# Patient Record
Sex: MALE | Race: OTHER | HISPANIC OR LATINO | Employment: FULL TIME | URBAN - METROPOLITAN AREA
[De-identification: names, ages, dates, MRNs, and addresses within clinical notes are randomized per-mention and may not be internally consistent; named-entity substitution may affect disease eponyms.]

---

## 2018-01-30 ENCOUNTER — HOSPITAL ENCOUNTER (EMERGENCY)
Facility: HOSPITAL | Age: 23
Discharge: HOME | End: 2018-01-30
Admitting: EMERGENCY MEDICINE
Payer: COMMERCIAL

## 2018-01-30 VITALS
WEIGHT: 205 LBS | RESPIRATION RATE: 18 BRPM | HEART RATE: 78 BPM | DIASTOLIC BLOOD PRESSURE: 68 MMHG | TEMPERATURE: 97.6 F | OXYGEN SATURATION: 100 % | SYSTOLIC BLOOD PRESSURE: 134 MMHG
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
## 2018-01-30 DIAGNOSIS — M54.2 NECK PAIN ON LEFT SIDE: ICD-10-CM

## 2018-01-30 DIAGNOSIS — M54.9 ACUTE LEFT-SIDED BACK PAIN: ICD-10-CM

## 2018-01-30 PROCEDURE — 99283 EMERGENCY DEPT VISIT LOW MDM: CPT
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
## 2018-01-30 NOTE — DISCHARGE INSTRUCTIONS
Dolor de jayce dominique   CUIDADO AMBULATORIO:   El dolor de jayce dominique  comienza repentinamente, aumenta rápidamente y desaparece en unos días  El dolor podría ir y venir, o podría empeorar con ciertos movimientos  El dolor podría sentirse solamente en cutler jayce, o podría pasarse a marija brazos, espalda u hombros  También podría sentir dolor que comienza en otra área de cutler cuerpo y se pasa a cutler jayce  Busque atención médica de inmediato si:   · Usted tiene bd lesión que le provoca dolor en el jayce y dolor punzante debajo de marija brazos o piernas  · Cutler dolor de jayce se agrava repentinamente  · Usted tiene dolor de jayce junto con entumecimiento, hormigueo o debilidad en marija brazos o piernas  · Usted tiene rigidez en el jayce, dolor de [place]  Pregúntele a cutler Maxcine Rosa vitaminas y minerales son adecuados para usted  · Usted tiene nuevos síntomas o marija síntomas empeoran  · Marija síntomas continúan aún después del [place]  · Usted tiene preguntas o inquietudes acerca de cutler condición o cuidado  El tratamiento  podría incluir cualquiera de lo siguiente, dependiendo de lo que esté provocando cutler dolor:  · Medicamentos,  podrían ser recetados o recomendados para el dolor por cutler médico  Es posible que usted necesite medicamento para tratar el dolor de nervios o para detener los espasmos musculares  También podrían darle medicamentos para reducir la inflamación  Cutler médico podría inyectar medicamento a un nervio para bloquear el dolor  El medicamento de venta sin receta AINEs o acetaminofén podría ser recomendado para tratar el dolor o inflamación leve  · La tracción  se [place] para aliviar la presión de los nervios  Le estirarán la garett cuidadosamente alejándola de cutler jayce  [place] estira los músculos y los ligamentos y le da más espacio a cutler columna  Cutler médico le indicará qué tipo de tracción ayudará a cutler dolor de jayce   No  use dispositivos de tracción en cutler casa a menos que se lo indique cutler médico   Controle o evite el dolor de jayce dominique:   · Repose el jayce hollis se lo indiquen  No realice movimientos repentinos, hollis voltear cutler garett rápidamente  Cutler médico podría recomendarle que use un collarín cervical por un periodo corto de [place]  El collarín evitará que usted [place] cutler [place]  [place] ayudará a darle tiempo a cutler jayce para que sane si es que db lesión está provocando cutler dolor  Pregunte a cutler médico cuándo puede volver a practicar deportes o realizar otras actividades cotidianas  · Aplique calor según indicaciones  El calor ayuda a aliviar el dolor y la inflamación  Use db envoltura caliente o empape db toalla pequeña en agua tibia  Escurra el exceso de Ad Listen  Aplique la venda caliente o la toalla por 20 minutos cada hora o hollis se le indique  · Aplique hielo según las indicaciones  El hielo ayuda a aliviar el dolor y la inflamación, y a evitar daño al tejido  Use un paquete con hielo o ponga hielo en db bolsa  Cubra el paquete con hielo o la espalda con db toalla antes de aplicarlo en cutler jayce  Aplique el paquete de hielo o la bolsa por 15 minutos cada hora o hollis se lo indiquen  Cutler médico puede indicarle con qué frecuencia aplicar el hielo  · Magy ejercicios para el jayce hollis se lo indiquen  Los ejercicios para el jayce ayudan a [employer] y a aumentar el rango de [place]  Cutler médico le indicará cuáles ejercicios son adecuados para usted  Podría darle instrucciones o podría recomendarle que acuda con un fisioterapeuta  Cutler médico o terapeuta pueden asegurarse que usted esté haciendo estos ejercicios correctamente  · Mantenga db buena postura  Trate de mantener cutler garett y hombros levantados cuando se siente  Si usted trabaja en frente de db computadora, asegúrese de que el monitor esté al nivel adecuado  Usted no debería tener que mirar hacia abajo para reinaldo la pantalla   [place]oco debe tener que inclinarse hacia adelante para poder leer lo que está en la pantalla  Asegúrese de que cutler teclado, ratón y otros artículos de computadora estén colocados en donde usted no tenga que extender kofi hombros para alcanzarlos  [place] a menudo si usted trabaja frente a db computadora o permanece sentado mona largos períodos  Estírese o camine para [place] [employer] de cutler jayce relajados  Acuda a kofi consultas de control con cutler médico según le indicaron  Cutler médico podría referirlo a un especialista si cutler dolor no mejora con el tratamiento  Anote kofi preguntas para que se acuerde de hacerlas mona kofi visitas  © 2017 [address] Information is for End User's use only and may not be sold, redistributed or otherwise used for commercial purposes  All illustrations and images included in CareNotes® are the copyrighted property of A D A M , Inc  or Seng Ortiz  Esta información es sólo para uso en educación  Cutler intención no es darle un consejo médico sobre enfermedades o tratamientos  Colsulte con cutler Terence Lauth farmacéutico antes de seguir cualquier régimen médico para saber si es seguro y efectivo para usted

## 2018-07-31 ENCOUNTER — OFFICE VISIT (OUTPATIENT)
Dept: FAMILY MEDICINE CLINIC | Facility: CLINIC | Age: 23
End: 2018-07-31

## 2018-07-31 VITALS
SYSTOLIC BLOOD PRESSURE: 110 MMHG | WEIGHT: 197.2 LBS | TEMPERATURE: 97.5 F | HEIGHT: 70 IN | BODY MASS INDEX: 28.23 KG/M2 | DIASTOLIC BLOOD PRESSURE: 66 MMHG

## 2018-07-31 DIAGNOSIS — J06.9 ACUTE URI: ICD-10-CM

## 2018-07-31 DIAGNOSIS — R05.9 COUGH: ICD-10-CM

## 2018-07-31 DIAGNOSIS — N50.89 MASS OF LEFT TESTICLE: Primary | ICD-10-CM

## 2018-07-31 PROCEDURE — 99212 OFFICE O/P EST SF 10 MIN: CPT | Performed by: NURSE PRACTITIONER

## 2018-07-31 RX ORDER — BENZONATATE 100 MG/1
100 CAPSULE ORAL 3 TIMES DAILY PRN
Qty: 20 CAPSULE | Refills: 0 | Status: SHIPPED | OUTPATIENT
Start: 2018-07-31 | End: 2021-01-20 | Stop reason: HOSPADM

## 2018-07-31 RX ORDER — AZITHROMYCIN 250 MG/1
TABLET, FILM COATED ORAL
Qty: 6 TABLET | Refills: 0 | Status: SHIPPED | OUTPATIENT
Start: 2018-07-31 | End: 2018-08-04

## 2018-07-31 RX ORDER — PREDNISONE 10 MG/1
TABLET ORAL
Qty: 21 TABLET | Refills: 0 | Status: SHIPPED | OUTPATIENT
Start: 2018-07-31 | End: 2021-01-20 | Stop reason: HOSPADM

## 2018-07-31 NOTE — PROGRESS NOTES
Assessment/Plan:    Acute URI  Will start azithromycin and prednisone and tessalon perles for cough  Call us if you experience any worsening symptoms or no improvement  Mass of left testicle  Firm immobile round lump on left lateral testicle  Non tender to palpation  No drainage  Present for 2 years and has grown in size  Will obtain ultrasound to identify etiology  Possible cyst  Patient currently does not have insurance and testing and health maintenance is currently limited  Patient does perform monthly testicular exams  As stated above care limited by patient's lack of insurance  He will look into medical assistance  Would like STD testing, [facility]  Will follow up with ultrasound results  Would be due for baseline lab work too but wishes to wait until insurance is figured out  Diagnoses and all orders for this visit:    Mass of left testicle  -     US scrotum and testicles; Future    Acute URI  -     predniSONE 10 mg tablet; Day 1: 6 tabs  Day 2: 5 tabs Day 3: 4 tabs  Day 4: 3 tabs  Day 5: 2 tabs  Day 6: 1 tab  -     azithromycin (ZITHROMAX) 250 mg tablet; Take 2 tablets today then 1 tablet daily x 4 days    Cough  -     benzonatate (TESSALON PERLES) 100 mg capsule; Take 1 capsule (100 mg total) by mouth 3 (three) times a day as needed for cough    Other orders  -     Cancel: HIV 1/2 Antigen/Antibody,Fourth Generation W/Rfl; Future  -     Cancel: RPR; Future  -     Cancel: Chlamydia/GC amplified DNA by PCR; Future  -     Cancel: HSV TYPE 1,2 DNA PCR; Future      Patient verbalizes understand and agrees with treatment plan  Subjective:      Patient ID: Shruthi West is a 23 y o  male    Chief Complaint   Patient presents with    [facility]    Migraine     started yesterday    Cough     symptoms started 2 days ago; taking dayquil and nyquil    Sore Throat    Chills    Back Pain     no urinary problems indicated    Mass     on scrotum; had for 2 years, no symptoms indicated          Patient presents to office today for evaluation of cold symptoms  He started 2 days ago and progressively worsened  Patient states that he initially did have lower back pain that resolved  He denies any frequency or urgency hematuria or dysuria  He has been trying DayQuil NyQuil which has not helped  A classmate if his was recently sick  He is currently a student at [employer]  He also has complaints of left-sided testicular lump before 2 years  Patient states over the past 2 years it has grown in size and gotten harder  He states that at 1st it seemed like it popped a little bit but has not done that in a long time  Denies any testicular swelling or pain  He states that sometimes his testicle feels uncomfortable at times but no pain on that specific spot  Patient overall is due for routine screening and health maintenance exams which have been put off due to lack of insurance at this time  Patient is looking into getting insurance  Cough   This is a new problem  The current episode started in the past 7 days  The problem has been gradually worsening  The cough is non-productive  Associated symptoms include ear congestion, nasal congestion, postnasal drip, rhinorrhea, a sore throat and wheezing  Pertinent negatives include no hemoptysis  There is no history of asthma  The following portions of the patient's history were reviewed and updated as appropriate: allergies, current medications, past family history, past social history and problem list   Review of Systems   HENT: Positive for postnasal drip, rhinorrhea and sore throat  Respiratory: Positive for wheezing  Negative for hemoptysis          Objective:  /66 (BP Location: Left arm, Patient Position: Sitting, Cuff Size: Standard)   Temp 97 5 °F (36 4 °C)   Ht 5' 10 25" (1 784 m)   Wt 89 4 kg (197 lb 3 2 oz)   BMI 28 09 kg/m²      Physical Exam   Constitutional: He is oriented to person, place, and time  He appears well-developed  No distress  HENT:   Head: Normocephalic and atraumatic  Right Ear: Hearing, external ear and ear canal normal  No drainage, swelling or tenderness  No foreign bodies  Tympanic membrane is not perforated, not erythematous, not retracted and not bulging  A middle ear effusion is present  No decreased hearing is noted  Left Ear: External ear and ear canal normal  No drainage, swelling or tenderness  No foreign bodies  Tympanic membrane is not perforated, not erythematous, not retracted and not bulging  A middle ear effusion is present  No decreased hearing is noted  Nose: Rhinorrhea present  Right sinus exhibits no maxillary sinus tenderness and no frontal sinus tenderness  Left sinus exhibits no maxillary sinus tenderness and no frontal sinus tenderness  Mouth/Throat: Uvula is midline  Posterior oropharyngeal erythema present  No oropharyngeal exudate or posterior oropharyngeal edema  Eyes: Conjunctivae and lids are normal  Right eye exhibits no discharge  Left eye exhibits no discharge  Neck: Normal range of motion  Neck supple  No tracheal deviation present  Cardiovascular: Normal rate and regular rhythm  No murmur heard  Pulmonary/Chest: Effort normal and breath sounds normal  No respiratory distress  He has no wheezes  Abdominal: Soft  Bowel sounds are normal  He exhibits no distension  There is no tenderness  There is no guarding  Genitourinary: Right testis shows no mass, no swelling and no tenderness  Right testis is descended  Left testis shows mass  Left testis shows no swelling and no tenderness  Left testis is descended  Genitourinary Comments: Left lateral testicular mass, firm and immobile and non tender  No edema erythema or warmth or drainage present  Musculoskeletal: Normal range of motion  He exhibits no edema, tenderness or deformity     Lymphadenopathy: He has no cervical adenopathy  Neurological: He is alert and oriented to person, place, and time  Coordination normal    Skin: Skin is warm and dry  No rash noted  He is not diaphoretic  No erythema  Psychiatric: He has a normal mood and affect  His speech is normal and behavior is normal  Judgment and thought content normal  Cognition and memory are normal    Nursing note and vitals reviewed

## 2018-07-31 NOTE — PATIENT INSTRUCTIONS
Start prednisone, this is the steroid  It will be 6 pills today and decrease by 1 pill each day for the following 6 days  So it will be 6-5-4-3-2-1  Take this with food as it may upset your stomach  It's best to take it earlier in the day otherwise it may keep you up at night  Start azithromycin, this is the antibiotic, This is 2 pills on day one and then 1 pill for the following 4 days  You may use tessalon Perles every 8 hours as needed for cough  Call us if you experience any worsening symptoms or no improvement  Call and schedule ultrasound of scrotum

## 2018-08-01 PROBLEM — N50.89 MASS OF LEFT TESTICLE: Status: ACTIVE | Noted: 2018-08-01

## 2018-08-01 NOTE — ASSESSMENT & PLAN NOTE
Firm immobile round lump on left lateral testicle  Non tender to palpation  No drainage  Present for 2 years and has grown in size  Will obtain ultrasound to identify etiology  Possible cyst  Patient currently does not have insurance and testing and health maintenance is currently limited  Patient does perform monthly testicular exams

## 2018-08-09 ENCOUNTER — HOSPITAL ENCOUNTER (OUTPATIENT)
Dept: ULTRASOUND IMAGING | Facility: HOSPITAL | Age: 23
Discharge: HOME | End: 2018-08-09

## 2018-08-09 DIAGNOSIS — N50.89 MASS OF LEFT TESTICLE: ICD-10-CM

## 2018-08-09 PROCEDURE — 76870 US EXAM SCROTUM: CPT

## 2019-11-19 ENCOUNTER — TELEPHONE (OUTPATIENT)
Dept: FAMILY MEDICINE CLINIC | Facility: CLINIC | Age: 24
End: 2019-11-19

## 2019-11-20 ENCOUNTER — TELEPHONE (OUTPATIENT)
Dept: FAMILY MEDICINE CLINIC | Facility: CLINIC | Age: 24
End: 2019-11-20

## 2019-11-20 NOTE — TELEPHONE ENCOUNTER
Wrong number was in the pt's chart  Lmom requesting Tory( at the correct number)  please call the office back to schedule na appointment

## 2019-11-20 NOTE — TELEPHONE ENCOUNTER
Attempted to call [facility] regarding scheduling an appointment  There was an answer and then someone said  the phone and call was disconnected

## 2019-11-22 NOTE — TELEPHONE ENCOUNTER
Per Mirna Quinn informed us that he currently does not have insurance once he does he will call to schedule an apppointment

## 2019-11-27 ENCOUNTER — OFFICE VISIT (OUTPATIENT)
Dept: FAMILY MEDICINE CLINIC | Facility: CLINIC | Age: 24
End: 2019-11-27

## 2019-11-27 VITALS
HEART RATE: 78 BPM | TEMPERATURE: 97.2 F | OXYGEN SATURATION: 96 % | SYSTOLIC BLOOD PRESSURE: 108 MMHG | RESPIRATION RATE: 19 BRPM | DIASTOLIC BLOOD PRESSURE: 64 MMHG | BODY MASS INDEX: 26.08 KG/M2 | HEIGHT: 73 IN | WEIGHT: 196.8 LBS

## 2019-11-27 DIAGNOSIS — N50.89 SCROTAL MASS: Primary | ICD-10-CM

## 2019-11-27 DIAGNOSIS — R05.9 COUGH: ICD-10-CM

## 2019-11-27 DIAGNOSIS — R10.32 LEFT LOWER QUADRANT ABDOMINAL PAIN: ICD-10-CM

## 2019-11-27 DIAGNOSIS — J06.9 UPPER RESPIRATORY TRACT INFECTION, UNSPECIFIED TYPE: ICD-10-CM

## 2019-11-27 PROCEDURE — 99213 OFFICE O/P EST LOW 20 MIN: CPT | Performed by: FAMILY MEDICINE

## 2019-11-27 RX ORDER — AZITHROMYCIN 250 MG/1
TABLET, FILM COATED ORAL
Qty: 6 TABLET | Refills: 0 | Status: SHIPPED | OUTPATIENT
Start: 2019-11-27 | End: 2019-12-02

## 2019-11-27 NOTE — PATIENT INSTRUCTIONS
Here for left scrotal skin cyst that is getting larger, will give info for urologist as this is larger  Take aleve otc prn left low back pain and will rec calling if not better or worse as his LLQ abdominal pain may be a strain in the muscle  Take abx as directed for URI if not betterand use Dimetapp DM cold and cough

## 2019-11-27 NOTE — PROGRESS NOTES
Assessment/Plan:  Chief Complaint   Patient presents with    Cold Like Symptoms     Pt c/o nasal and chest congestion with cough x1 week   Mass     Pt has a mass on his L/groin area x1 year and it has been increasing in size  Patient Instructions   Here for left scrotal skin cyst that is getting larger, will give info for urologist as this is larger  Take aleve otc prn left low back pain and will rec calling if not better or worse as his LLQ abdominal pain may be a strain in the muscle  Take abx as directed for URI if not betterand use Dimetapp DM cold and cough  No problem-specific Assessment & Plan notes found for this encounter  Diagnoses and all orders for this visit:    Scrotal mass    Upper respiratory tract infection, unspecified type  -     azithromycin (ZITHROMAX) 250 mg tablet; Take 2 tablets today then 1 tablet daily x 4 days    Cough  -     azithromycin (ZITHROMAX) 250 mg tablet; Take 2 tablets today then 1 tablet daily x 4 days    Left lower quadrant abdominal pain          Subjective:      Patient ID: Anamika Carrillo is a 30 y o  male  Cold Like Symptoms (Pt c/o nasal and chest congestion with cough x1 week  )  Mass (Pt has a mass on his L/groin area x1 year and it has been increasing in size  ) Has left low back pain and also left scrotal skin cyst seen on US previously  This is getting larger  Has also noticed LLQ abdominal pain and worse with stretching left low back  The following portions of the patient's history were reviewed and updated as appropriate: allergies, current medications, past family history, past medical history, past social history, past surgical history and problem list     Review of Systems   Constitutional: Negative  HENT: Positive for congestion  Eyes: Negative  Respiratory: Positive for cough  Cardiovascular: Negative      Gastrointestinal: Positive for abdominal pain (left lower quadrant pain worse with stretching left lower back)    Endocrine: Negative  Genitourinary:        Scrotal mass   Musculoskeletal: Positive for back pain (left low back pain)  Skin: Negative  Allergic/Immunologic: Negative  Neurological: Negative  Hematological: Negative  Psychiatric/Behavioral: Negative  Objective:      /64 (BP Location: Left arm, Patient Position: Sitting, Cuff Size: Adult)   Pulse 78   Temp (!) 97 2 °F (36 2 °C) (Tympanic)   Resp 19   Ht 6' 1" (1 854 m)   Wt 89 3 kg (196 lb 12 8 oz)   SpO2 96%   BMI 25 96 kg/m²          Physical Exam   Constitutional: He is oriented to person, place, and time  He appears well-developed and well-nourished  HENT:   Head: Normocephalic and atraumatic  Right Ear: External ear normal    Left Ear: External ear normal    Nose: Nose normal    Mouth/Throat: Oropharynx is clear and moist    Eyes: Pupils are equal, round, and reactive to light  Conjunctivae and EOM are normal    Neck: Normal range of motion  Neck supple  Cardiovascular: Normal rate, regular rhythm, normal heart sounds and intact distal pulses  Pulmonary/Chest: Effort normal and breath sounds normal    Abdominal: Soft  Bowel sounds are normal    Tender left lower quadrant worse with stretching of left low back  Genitourinary:   Genitourinary Comments: Pain and tenderness over left scrotal area mass, left scrotal skin cyst seen on US  Musculoskeletal: Normal range of motion  Left low back pain    Neurological: He is alert and oriented to person, place, and time  He has normal reflexes  Skin: Skin is warm and dry  Psychiatric: He has a normal mood and affect   His behavior is normal

## 2020-02-25 ENCOUNTER — TELEPHONE (OUTPATIENT)
Dept: FAMILY MEDICINE CLINIC | Facility: CLINIC | Age: 25
End: 2020-02-25

## 2020-02-25 NOTE — TELEPHONE ENCOUNTER
A note is fine if needed  slowly introducing fluids so only a few sips at a time, if well tolerated can repeat in about 15-30 minutes then so on,  Can increase amount and frequency if well tolerated  Then can progress to bland food/thick liquids like jello or toast    Hydration is important- gatorade or a drink with electrolytes are helpful       Acute nausea and vomiting can last 1-2 days- if no improvement or any worsening symptoms call or go to ER

## 2020-02-25 NOTE — TELEPHONE ENCOUNTER
Patients step mother states that patient has been vomiting all night  He can not keep anything down  What is the protocol for keeping liquids down? He missed class yesterday and today  Can he also have a school note

## 2020-03-31 ENCOUNTER — TELEMEDICINE (OUTPATIENT)
Dept: FAMILY MEDICINE CLINIC | Facility: CLINIC | Age: 25
End: 2020-03-31
Payer: OTHER GOVERNMENT

## 2020-03-31 DIAGNOSIS — Z20.828 EXPOSURE TO SARS-ASSOCIATED CORONAVIRUS: ICD-10-CM

## 2020-03-31 DIAGNOSIS — Z20.828 EXPOSURE TO SARS-ASSOCIATED CORONAVIRUS: Primary | ICD-10-CM

## 2020-03-31 PROCEDURE — 87635 SARS-COV-2 COVID-19 AMP PRB: CPT

## 2020-03-31 PROCEDURE — G2012 BRIEF CHECK IN BY MD/QHP: HCPCS | Performed by: NURSE PRACTITIONER

## 2020-03-31 NOTE — PROGRESS NOTES
COVID-19 Virtual Visit     This virtual check-in was done via [employer] and patient was informed that this is not a secure, HIPAA-complaint platform  he agrees to proceed     Encounter provider Emeli Stover, [facility]    Provider located at [address] [address]    Recent Visits  No visits were found meeting these conditions  Showing recent visits within past 7 days and meeting all other requirements     Today's Visits  Date Type Provider Dept   03/31/20 Telemedicine Emeli Stover, [facility] [address] [facility] today's visits and meeting all other requirements     Future Appointments  Date Type Provider Dept   03/31/20 Telemedicine Emeli Stover, [facility] [address] [facility]   Showing future appointments within next 150 days and meeting all other requirements        Patient agrees to participate in a virtual check in via telephone or video visit instead of presenting to the office to address urgent/immediate medical needs  Patient is aware this is a billable service  After connecting through televideo, the patient was identified by name and date of birth  Ida Lee was informed that this was a telemedicine visit and that the exam was being conducted confidentially over secure lines  My office door was closed  No one else was in the room  Ida Lee acknowledged consent and understanding of privacy and security of the telemedicine visit  I informed the patient that I have reviewed his record in Epic and presented the opportunity for him to ask any questions regarding the visit today  The patient agreed to participate  Ida Lee is a 30 y o  male who is concerned about COVID-19  He reports fever, cough and shortness of breath  He has not traveled outside the [place]  within the last 14 days    He has not had contact with a person who is under investigation for or who is positive for COVID-19 within the last 14 days  He has not been hospitalized recently for fever and/or lower respiratory symptoms  Has virtual visit today for evaluation of fever  His temp was checked at work prior to entering building  Temp was 101 5 and then went to 100 5, and then 45 minutes later it was back at 101  They were using a temporal thermometer  He doesn't feel shaky  Has chronic cough due to smoking, unsure if it's changed  Has had some very slight shortness of breath but has had that for a few months  At home checked his temperature and it was 97  No GI symptoms, no rashes or headaches  Was in [place] working the past 2 weeks until 2 days ago  Was in [address] [place] [address], no known exposure to [employer] while on the phone was 97 7  Recent weight was 195  Hasn't taken tylenol or advil this morning  Did take ibuprofen yesterday for hangover  He states no one else they checked had a temperature this morning  No past medical history on file  Past Surgical History:   Procedure Laterality Date    FRACTURE SURGERY         Current Outpatient Medications   Medication Sig Dispense Refill    benzonatate (TESSALON PERLES) 100 mg capsule Take 1 capsule (100 mg total) by mouth 3 (three) times a day as needed for cough (Patient not taking: Reported on 11/27/2019) 20 capsule 0    predniSONE 10 mg tablet Day 1: 6 tabs  Day 2: 5 tabs Day 3: 4 tabs  Day 4: 3 tabs  Day 5: 2 tabs  Day 6: 1 tab (Patient not taking: Reported on 11/27/2019) 21 tablet 0     No current facility-administered medications for this visit  No Known Allergies    Video Exam    Harjeet appears healthy, alert, no distress  Disposition:      I referred Cassius William to one of our centralized sites for a COVID-19 swab      I spent 15 minutes with the patient during this virtual check-in visit

## 2020-04-03 ENCOUNTER — TELEPHONE (OUTPATIENT)
Dept: FAMILY MEDICINE CLINIC | Facility: CLINIC | Age: 25
End: 2020-04-03

## 2020-04-03 LAB — SARS-COV-2 RNA SPEC QL NAA+PROBE: NOT DETECTED

## 2021-01-19 ENCOUNTER — ANESTHESIA (EMERGENCY)
Dept: PERIOP | Facility: HOSPITAL | Age: 26
End: 2021-01-19
Payer: COMMERCIAL

## 2021-01-19 ENCOUNTER — APPOINTMENT (EMERGENCY)
Dept: CT IMAGING | Facility: HOSPITAL | Age: 26
End: 2021-01-19
Payer: COMMERCIAL

## 2021-01-19 ENCOUNTER — ANESTHESIA EVENT (EMERGENCY)
Dept: PERIOP | Facility: HOSPITAL | Age: 26
End: 2021-01-19
Payer: COMMERCIAL

## 2021-01-19 ENCOUNTER — HOSPITAL ENCOUNTER (OUTPATIENT)
Facility: HOSPITAL | Age: 26
Setting detail: OBSERVATION
Discharge: HOME | End: 2021-01-20
Attending: EMERGENCY MEDICINE | Admitting: SURGERY
Payer: COMMERCIAL

## 2021-01-19 VITALS — HEART RATE: 113 BPM

## 2021-01-19 DIAGNOSIS — K35.30 ACUTE APPENDICITIS WITH LOCALIZED PERITONITIS, WITHOUT PERFORATION, ABSCESS, OR GANGRENE: Primary | ICD-10-CM

## 2021-01-19 DIAGNOSIS — R10.9 ABDOMINAL PAIN: ICD-10-CM

## 2021-01-19 DIAGNOSIS — E86.0 DEHYDRATION: ICD-10-CM

## 2021-01-19 DIAGNOSIS — R11.2 NAUSEA AND VOMITING: ICD-10-CM

## 2021-01-19 DIAGNOSIS — K35.80 ACUTE APPENDICITIS, UNSPECIFIED ACUTE APPENDICITIS TYPE: ICD-10-CM

## 2021-01-19 DIAGNOSIS — K37 APPENDICITIS: ICD-10-CM

## 2021-01-19 LAB
ALBUMIN SERPL BCP-MCNC: 4.9 G/DL (ref 3–5.2)
ALP SERPL-CCNC: 75 U/L (ref 43–122)
ALT SERPL W P-5'-P-CCNC: 10 U/L (ref 9–52)
ANION GAP SERPL CALCULATED.3IONS-SCNC: 10 MMOL/L (ref 5–14)
AST SERPL W P-5'-P-CCNC: 21 U/L (ref 17–59)
BASOPHILS # BLD AUTO: 0.19 THOUSAND/UL (ref 0–0.1)
BASOPHILS NFR MAR MANUAL: 1 % (ref 0–1)
BILIRUB SERPL-MCNC: 0.9 MG/DL
BUN SERPL-MCNC: 16 MG/DL (ref 5–25)
CALCIUM SERPL-MCNC: 10.1 MG/DL (ref 8.4–10.2)
CHLORIDE SERPL-SCNC: 100 MMOL/L (ref 97–108)
CO2 SERPL-SCNC: 31 MMOL/L (ref 22–30)
CREAT SERPL-MCNC: 0.88 MG/DL (ref 0.7–1.5)
EOSINOPHIL # BLD AUTO: 0.19 THOUSAND/UL (ref 0–0.4)
EOSINOPHIL NFR BLD MANUAL: 1 % (ref 0–6)
ERYTHROCYTE [DISTWIDTH] IN BLOOD BY AUTOMATED COUNT: 13.2 %
GFR SERPL CREATININE-BSD FRML MDRD: 118 ML/MIN/1.73SQ M
GLUCOSE SERPL-MCNC: 103 MG/DL (ref 70–99)
HCT VFR BLD AUTO: 47.1 % (ref 41–53)
HGB BLD-MCNC: 15.3 G/DL (ref 13.5–17.5)
LYMPHOCYTES # BLD AUTO: 1.75 THOUSAND/UL (ref 0.5–4)
LYMPHOCYTES # BLD AUTO: 9 % (ref 25–45)
MCH RBC QN AUTO: 31.1 PG (ref 26–34)
MCHC RBC AUTO-ENTMCNC: 32.5 G/DL (ref 31–36)
MCV RBC AUTO: 96 FL (ref 80–100)
MONOCYTES # BLD AUTO: 1.75 THOUSAND/UL (ref 0.2–0.9)
MONOCYTES NFR BLD AUTO: 9 % (ref 1–10)
NEUTS BAND NFR BLD MANUAL: 1 % (ref 0–8)
NEUTS SEG # BLD: 15.13 THOUSAND/UL (ref 1.8–7.8)
NEUTS SEG NFR BLD AUTO: 77 %
PLATELET # BLD AUTO: 309 THOUSANDS/UL (ref 150–450)
PLATELET BLD QL SMEAR: ADEQUATE
PMV BLD AUTO: 9 FL (ref 8.9–12.7)
POTASSIUM SERPL-SCNC: 4 MMOL/L (ref 3.6–5)
PROT SERPL-MCNC: 8.9 G/DL (ref 5.9–8.4)
RBC # BLD AUTO: 4.92 MILLION/UL (ref 4.5–5.9)
RBC MORPH BLD: NORMAL
SODIUM SERPL-SCNC: 141 MMOL/L (ref 137–147)
TOTAL CELLS COUNTED SPEC: 100
VARIANT LYMPHS # BLD AUTO: 2 % (ref 0–0)
WBC # BLD AUTO: 19.4 THOUSAND/UL (ref 4.5–11)

## 2021-01-19 PROCEDURE — 74177 CT ABD & PELVIS W/CONTRAST: CPT

## 2021-01-19 PROCEDURE — 96361 HYDRATE IV INFUSION ADD-ON: CPT

## 2021-01-19 PROCEDURE — 96365 THER/PROPH/DIAG IV INF INIT: CPT

## 2021-01-19 PROCEDURE — 96368 THER/DIAG CONCURRENT INF: CPT

## 2021-01-19 PROCEDURE — 85027 COMPLETE CBC AUTOMATED: CPT | Performed by: EMERGENCY MEDICINE

## 2021-01-19 PROCEDURE — 99285 EMERGENCY DEPT VISIT HI MDM: CPT | Performed by: EMERGENCY MEDICINE

## 2021-01-19 PROCEDURE — 44970 LAPAROSCOPY APPENDECTOMY: CPT | Performed by: PHYSICIAN ASSISTANT

## 2021-01-19 PROCEDURE — 36415 COLL VENOUS BLD VENIPUNCTURE: CPT | Performed by: EMERGENCY MEDICINE

## 2021-01-19 PROCEDURE — 99285 EMERGENCY DEPT VISIT HI MDM: CPT

## 2021-01-19 PROCEDURE — 85007 BL SMEAR W/DIFF WBC COUNT: CPT | Performed by: EMERGENCY MEDICINE

## 2021-01-19 PROCEDURE — C9113 INJ PANTOPRAZOLE SODIUM, VIA: HCPCS | Performed by: EMERGENCY MEDICINE

## 2021-01-19 PROCEDURE — 44970 LAPAROSCOPY APPENDECTOMY: CPT | Performed by: SURGERY

## 2021-01-19 PROCEDURE — 99220 PR INITIAL OBSERVATION CARE/DAY 70 MINUTES: CPT | Performed by: SURGERY

## 2021-01-19 PROCEDURE — 80053 COMPREHEN METABOLIC PANEL: CPT | Performed by: EMERGENCY MEDICINE

## 2021-01-19 PROCEDURE — 88304 TISSUE EXAM BY PATHOLOGIST: CPT | Performed by: PATHOLOGY

## 2021-01-19 PROCEDURE — 96375 TX/PRO/DX INJ NEW DRUG ADDON: CPT

## 2021-01-19 RX ORDER — SODIUM CHLORIDE 9 MG/ML
125 INJECTION, SOLUTION INTRAVENOUS CONTINUOUS
Status: DISCONTINUED | OUTPATIENT
Start: 2021-01-19 | End: 2021-01-20

## 2021-01-19 RX ORDER — NEOSTIGMINE METHYLSULFATE 1 MG/ML
INJECTION INTRAVENOUS AS NEEDED
Status: DISCONTINUED | OUTPATIENT
Start: 2021-01-19 | End: 2021-01-19

## 2021-01-19 RX ORDER — FENTANYL CITRATE 50 UG/ML
INJECTION, SOLUTION INTRAMUSCULAR; INTRAVENOUS AS NEEDED
Status: DISCONTINUED | OUTPATIENT
Start: 2021-01-19 | End: 2021-01-19

## 2021-01-19 RX ORDER — SODIUM CHLORIDE 9 MG/ML
INJECTION, SOLUTION INTRAVENOUS AS NEEDED
Status: DISCONTINUED | OUTPATIENT
Start: 2021-01-19 | End: 2021-01-19 | Stop reason: HOSPADM

## 2021-01-19 RX ORDER — PANTOPRAZOLE SODIUM 40 MG/1
40 INJECTION, POWDER, FOR SOLUTION INTRAVENOUS ONCE
Status: COMPLETED | OUTPATIENT
Start: 2021-01-19 | End: 2021-01-19

## 2021-01-19 RX ORDER — ACETAMINOPHEN 325 MG/1
650 TABLET ORAL EVERY 6 HOURS PRN
Status: DISCONTINUED | OUTPATIENT
Start: 2021-01-19 | End: 2021-01-20 | Stop reason: HOSPADM

## 2021-01-19 RX ORDER — ONDANSETRON 2 MG/ML
4 INJECTION INTRAMUSCULAR; INTRAVENOUS EVERY 4 HOURS PRN
Status: DISCONTINUED | OUTPATIENT
Start: 2021-01-19 | End: 2021-01-20 | Stop reason: HOSPADM

## 2021-01-19 RX ORDER — OXYCODONE HYDROCHLORIDE 5 MG/1
5 TABLET ORAL EVERY 4 HOURS PRN
Status: DISCONTINUED | OUTPATIENT
Start: 2021-01-19 | End: 2021-01-20 | Stop reason: HOSPADM

## 2021-01-19 RX ORDER — PROPOFOL 10 MG/ML
INJECTION, EMULSION INTRAVENOUS AS NEEDED
Status: DISCONTINUED | OUTPATIENT
Start: 2021-01-19 | End: 2021-01-19

## 2021-01-19 RX ORDER — OXYCODONE HYDROCHLORIDE 10 MG/1
10 TABLET ORAL EVERY 4 HOURS PRN
Status: DISCONTINUED | OUTPATIENT
Start: 2021-01-19 | End: 2021-01-20 | Stop reason: HOSPADM

## 2021-01-19 RX ORDER — ONDANSETRON 2 MG/ML
INJECTION INTRAMUSCULAR; INTRAVENOUS AS NEEDED
Status: DISCONTINUED | OUTPATIENT
Start: 2021-01-19 | End: 2021-01-19

## 2021-01-19 RX ORDER — SODIUM CHLORIDE, SODIUM LACTATE, POTASSIUM CHLORIDE, CALCIUM CHLORIDE 600; 310; 30; 20 MG/100ML; MG/100ML; MG/100ML; MG/100ML
INJECTION, SOLUTION INTRAVENOUS CONTINUOUS PRN
Status: DISCONTINUED | OUTPATIENT
Start: 2021-01-19 | End: 2021-01-19

## 2021-01-19 RX ORDER — HYDROMORPHONE HCL/PF 1 MG/ML
0.5 SYRINGE (ML) INJECTION
Status: DISCONTINUED | OUTPATIENT
Start: 2021-01-19 | End: 2021-01-20 | Stop reason: HOSPADM

## 2021-01-19 RX ORDER — LIDOCAINE HYDROCHLORIDE 10 MG/ML
INJECTION, SOLUTION EPIDURAL; INFILTRATION; INTRACAUDAL; PERINEURAL AS NEEDED
Status: DISCONTINUED | OUTPATIENT
Start: 2021-01-19 | End: 2021-01-19

## 2021-01-19 RX ORDER — ROCURONIUM BROMIDE 10 MG/ML
INJECTION, SOLUTION INTRAVENOUS AS NEEDED
Status: DISCONTINUED | OUTPATIENT
Start: 2021-01-19 | End: 2021-01-19

## 2021-01-19 RX ORDER — ONDANSETRON 2 MG/ML
4 INJECTION INTRAMUSCULAR; INTRAVENOUS ONCE AS NEEDED
Status: DISCONTINUED | OUTPATIENT
Start: 2021-01-19 | End: 2021-01-19 | Stop reason: HOSPADM

## 2021-01-19 RX ORDER — METOCLOPRAMIDE HYDROCHLORIDE 5 MG/ML
INJECTION INTRAMUSCULAR; INTRAVENOUS AS NEEDED
Status: DISCONTINUED | OUTPATIENT
Start: 2021-01-19 | End: 2021-01-19

## 2021-01-19 RX ORDER — FENTANYL CITRATE/PF 50 MCG/ML
25 SYRINGE (ML) INJECTION
Status: COMPLETED | OUTPATIENT
Start: 2021-01-19 | End: 2021-01-19

## 2021-01-19 RX ORDER — HYDROMORPHONE HCL/PF 1 MG/ML
0.5 SYRINGE (ML) INJECTION
Status: DISCONTINUED | OUTPATIENT
Start: 2021-01-19 | End: 2021-01-19 | Stop reason: HOSPADM

## 2021-01-19 RX ORDER — GLYCOPYRROLATE 0.2 MG/ML
INJECTION INTRAMUSCULAR; INTRAVENOUS AS NEEDED
Status: DISCONTINUED | OUTPATIENT
Start: 2021-01-19 | End: 2021-01-19

## 2021-01-19 RX ORDER — CEFAZOLIN SODIUM 2 G/50ML
2000 SOLUTION INTRAVENOUS ONCE
Status: COMPLETED | OUTPATIENT
Start: 2021-01-19 | End: 2021-01-19

## 2021-01-19 RX ORDER — BUPIVACAINE HYDROCHLORIDE 5 MG/ML
INJECTION, SOLUTION PERINEURAL AS NEEDED
Status: DISCONTINUED | OUTPATIENT
Start: 2021-01-19 | End: 2021-01-19 | Stop reason: HOSPADM

## 2021-01-19 RX ORDER — ONDANSETRON 4 MG/1
4 TABLET, ORALLY DISINTEGRATING ORAL EVERY 8 HOURS PRN
Qty: 20 TABLET | Refills: 0 | Status: CANCELLED | OUTPATIENT
Start: 2021-01-19

## 2021-01-19 RX ORDER — ONDANSETRON 2 MG/ML
4 INJECTION INTRAMUSCULAR; INTRAVENOUS ONCE
Status: COMPLETED | OUTPATIENT
Start: 2021-01-19 | End: 2021-01-19

## 2021-01-19 RX ORDER — FENTANYL CITRATE 50 UG/ML
50 INJECTION, SOLUTION INTRAMUSCULAR; INTRAVENOUS ONCE
Status: COMPLETED | OUTPATIENT
Start: 2021-01-19 | End: 2021-01-19

## 2021-01-19 RX ORDER — MIDAZOLAM HYDROCHLORIDE 2 MG/2ML
INJECTION, SOLUTION INTRAMUSCULAR; INTRAVENOUS AS NEEDED
Status: DISCONTINUED | OUTPATIENT
Start: 2021-01-19 | End: 2021-01-19

## 2021-01-19 RX ORDER — DICYCLOMINE HCL 20 MG
20 TABLET ORAL 2 TIMES DAILY
Qty: 20 TABLET | Refills: 0 | Status: CANCELLED | OUTPATIENT
Start: 2021-01-19

## 2021-01-19 RX ORDER — SODIUM CHLORIDE, SODIUM LACTATE, POTASSIUM CHLORIDE, CALCIUM CHLORIDE 600; 310; 30; 20 MG/100ML; MG/100ML; MG/100ML; MG/100ML
125 INJECTION, SOLUTION INTRAVENOUS CONTINUOUS
Status: DISCONTINUED | OUTPATIENT
Start: 2021-01-19 | End: 2021-01-20

## 2021-01-19 RX ORDER — SUCCINYLCHOLINE/SOD CL,ISO/PF 100 MG/5ML
SYRINGE (ML) INTRAVENOUS AS NEEDED
Status: DISCONTINUED | OUTPATIENT
Start: 2021-01-19 | End: 2021-01-19

## 2021-01-19 RX ADMIN — HYDROMORPHONE HYDROCHLORIDE 0.5 MG: 1 INJECTION, SOLUTION INTRAMUSCULAR; INTRAVENOUS; SUBCUTANEOUS at 23:54

## 2021-01-19 RX ADMIN — FENTANYL CITRATE 50 MCG: 50 INJECTION INTRAMUSCULAR; INTRAVENOUS at 17:56

## 2021-01-19 RX ADMIN — NEOSTIGMINE METHYLSULFATE 4 MG: 1 INJECTION INTRAVENOUS at 18:39

## 2021-01-19 RX ADMIN — GLYCOPYRROLATE 0.4 MG: 0.2 INJECTION, SOLUTION INTRAMUSCULAR; INTRAVENOUS at 18:39

## 2021-01-19 RX ADMIN — METRONIDAZOLE 500 MG: 500 INJECTION, SOLUTION INTRAVENOUS at 17:06

## 2021-01-19 RX ADMIN — PANTOPRAZOLE SODIUM 40 MG: 40 INJECTION, POWDER, LYOPHILIZED, FOR SOLUTION INTRAVENOUS at 13:35

## 2021-01-19 RX ADMIN — SODIUM CHLORIDE, SODIUM LACTATE, POTASSIUM CHLORIDE, AND CALCIUM CHLORIDE 125 ML/HR: .6; .31; .03; .02 INJECTION, SOLUTION INTRAVENOUS at 21:10

## 2021-01-19 RX ADMIN — FENTANYL CITRATE 25 MCG: 50 INJECTION INTRAMUSCULAR; INTRAVENOUS at 19:29

## 2021-01-19 RX ADMIN — PROPOFOL 200 MG: 10 INJECTION, EMULSION INTRAVENOUS at 17:50

## 2021-01-19 RX ADMIN — METOCLOPRAMIDE 10 MG: 5 INJECTION, SOLUTION INTRAMUSCULAR; INTRAVENOUS at 18:06

## 2021-01-19 RX ADMIN — FENTANYL CITRATE 50 MCG: 50 INJECTION INTRAMUSCULAR; INTRAVENOUS at 18:05

## 2021-01-19 RX ADMIN — SODIUM CHLORIDE, SODIUM LACTATE, POTASSIUM CHLORIDE, AND CALCIUM CHLORIDE: .6; .31; .03; .02 INJECTION, SOLUTION INTRAVENOUS at 17:49

## 2021-01-19 RX ADMIN — FENTANYL CITRATE 25 MCG: 50 INJECTION INTRAMUSCULAR; INTRAVENOUS at 19:34

## 2021-01-19 RX ADMIN — OXYCODONE HYDROCHLORIDE 10 MG: 10 TABLET ORAL at 21:09

## 2021-01-19 RX ADMIN — FENTANYL CITRATE 50 MCG: 50 INJECTION INTRAMUSCULAR; INTRAVENOUS at 17:50

## 2021-01-19 RX ADMIN — CEFAZOLIN SODIUM 2000 MG: 2 SOLUTION INTRAVENOUS at 16:13

## 2021-01-19 RX ADMIN — LIDOCAINE HYDROCHLORIDE 50 MG: 10 INJECTION, SOLUTION EPIDURAL; INFILTRATION; INTRACAUDAL; PERINEURAL at 17:51

## 2021-01-19 RX ADMIN — FENTANYL CITRATE 25 MCG: 50 INJECTION INTRAMUSCULAR; INTRAVENOUS at 19:23

## 2021-01-19 RX ADMIN — ROCURONIUM BROMIDE 10 MG: 10 INJECTION, SOLUTION INTRAVENOUS at 17:51

## 2021-01-19 RX ADMIN — Medication 200 MG: at 17:54

## 2021-01-19 RX ADMIN — FENTANYL CITRATE 50 MCG: 50 INJECTION INTRAMUSCULAR; INTRAVENOUS at 18:32

## 2021-01-19 RX ADMIN — FENTANYL CITRATE 25 MCG: 50 INJECTION INTRAMUSCULAR; INTRAVENOUS at 19:03

## 2021-01-19 RX ADMIN — SODIUM CHLORIDE 1000 ML: 0.9 INJECTION, SOLUTION INTRAVENOUS at 13:34

## 2021-01-19 RX ADMIN — FENTANYL CITRATE 50 MCG: 50 INJECTION INTRAMUSCULAR; INTRAVENOUS at 16:24

## 2021-01-19 RX ADMIN — ONDANSETRON HYDROCHLORIDE 4 MG: 2 INJECTION, SOLUTION INTRAMUSCULAR; INTRAVENOUS at 18:05

## 2021-01-19 RX ADMIN — SODIUM CHLORIDE 1000 ML: 0.9 INJECTION, SOLUTION INTRAVENOUS at 14:51

## 2021-01-19 RX ADMIN — IOHEXOL 100 ML: 350 INJECTION, SOLUTION INTRAVENOUS at 15:24

## 2021-01-19 RX ADMIN — MIDAZOLAM HYDROCHLORIDE 2 MG: 1 INJECTION, SOLUTION INTRAMUSCULAR; INTRAVENOUS at 17:50

## 2021-01-19 RX ADMIN — ONDANSETRON 4 MG: 2 INJECTION INTRAMUSCULAR; INTRAVENOUS at 21:10

## 2021-01-19 RX ADMIN — ONDANSETRON 4 MG: 2 INJECTION INTRAMUSCULAR; INTRAVENOUS at 13:34

## 2021-01-19 NOTE — ED PROVIDER NOTES
History  Chief Complaint   Patient presents with    Abdominal Pain     abd pain and vomiting for 3 days     53-year-old gentleman presents with a three day history nausea and vomiting, decreased p o  Intake, and abdominal cramping  He denies any fevers but states at times he does feel somewhat chilly  He has not had any diarrhea and denies any URI symptoms or known COVID exposures  He has been taking multiple over-the-counter medications for heartburn and upset stomach with minimal to no improvement of symptoms  Abdominal Pain  Pain location:  Generalized  Pain quality: aching and cramping    Pain radiates to:  Does not radiate  Pain severity:  Moderate  Onset quality:  Gradual  Duration:  3 days  Timing:  Constant  Progression:  Waxing and waning  Relieved by:  Nothing  Worsened by:  Nothing  Ineffective treatments:  Antacids  Associated symptoms: anorexia, chills, fatigue, nausea and vomiting    Associated symptoms: no chest pain, no constipation, no diarrhea, no fever, no shortness of breath and no sore throat        Prior to Admission Medications   Prescriptions Last Dose Informant Patient Reported? Taking?   benzonatate (TESSALON PERLES) 100 mg capsule   No No   Sig: Take 1 capsule (100 mg total) by mouth 3 (three) times a day as needed for cough   Patient not taking: Reported on 11/27/2019   predniSONE 10 mg tablet   No No   Sig: Day 1: 6 tabs  Day 2: 5 tabs Day 3: 4 tabs  Day 4: 3 tabs  Day 5: 2 tabs  Day 6: 1 tab   Patient not taking: Reported on 11/27/2019      Facility-Administered Medications: None       History reviewed  No pertinent past medical history  Past Surgical History:   Procedure Laterality Date    FRACTURE SURGERY      rt elbow       History reviewed  No pertinent family history  I have reviewed and agree with the history as documented      E-Cigarette/Vaping    E-Cigarette Use Never User      E-Cigarette/Vaping Substances     Social History     Tobacco Use    Smoking status: Current Every Day Smoker     Packs/day: 0 50     Types: Cigarettes    Smokeless tobacco: Never Used   Substance Use Topics    Alcohol use: Yes     Comment: occ    Drug use: Yes     Types: Marijuana     Comment: occ       Review of Systems   Constitutional: Positive for appetite change, chills and fatigue  Negative for activity change and fever  HENT: Negative  Negative for congestion, postnasal drip, rhinorrhea, sinus pain, sore throat and trouble swallowing  Eyes: Negative  Respiratory: Negative  Negative for shortness of breath  Cardiovascular: Negative for chest pain  Gastrointestinal: Positive for abdominal pain, anorexia, nausea and vomiting  Negative for constipation and diarrhea  Endocrine: Negative  Genitourinary: Negative  Musculoskeletal: Negative  Negative for arthralgias, back pain and myalgias  Skin: Negative  Allergic/Immunologic: Negative  Neurological: Negative  Hematological: Negative  Psychiatric/Behavioral: Negative  Physical Exam  Physical Exam  Vitals signs and nursing note reviewed  Constitutional:       General: He is not in acute distress  Appearance: Normal appearance  He is well-developed  He is not ill-appearing or diaphoretic  HENT:      Head: Normocephalic and atraumatic  Nose: Nose normal       Mouth/Throat:      Mouth: Mucous membranes are dry  Pharynx: Oropharynx is clear  Eyes:      Conjunctiva/sclera: Conjunctivae normal       Pupils: Pupils are equal, round, and reactive to light  Neck:      Musculoskeletal: Neck supple  Cardiovascular:      Rate and Rhythm: Normal rate and regular rhythm  Heart sounds: Normal heart sounds  Pulmonary:      Effort: Pulmonary effort is normal  No respiratory distress  Breath sounds: Normal breath sounds  Abdominal:      General: Bowel sounds are normal  There is no distension  Palpations: Abdomen is soft  Tenderness:  There is generalized abdominal tenderness (mild, diffuse)  There is no guarding  Musculoskeletal: Normal range of motion  Skin:     General: Skin is warm and dry  Capillary Refill: Capillary refill takes less than 2 seconds  Neurological:      General: No focal deficit present  Mental Status: He is alert and oriented to person, place, and time     Psychiatric:         Mood and Affect: Mood normal          Behavior: Behavior normal          Vital Signs  ED Triage Vitals   Temperature Pulse Respirations Blood Pressure SpO2   01/19/21 1218 01/19/21 1218 01/19/21 1218 01/19/21 1218 01/19/21 1218   (!) 97 3 °F (36 3 °C) 85 16 135/90 98 %      Temp Source Heart Rate Source Patient Position - Orthostatic VS BP Location FiO2 (%)   01/19/21 1955 01/19/21 1218 01/19/21 1218 01/19/21 1218 --   Temporal Monitor Sitting Left arm       Pain Score       01/19/21 1218       8           Vitals:    01/19/21 2235 01/19/21 2330 01/20/21 0100 01/20/21 0400   BP: 126/78 116/75 152/92 140/87   Pulse: 87 93 85 70   Patient Position - Orthostatic VS: Lying Lying Sitting Lying         Visual Acuity      ED Medications  Medications   sodium chloride 0 9 % infusion (has no administration in time range)   nicotine (NICODERM CQ) 7 mg/24hr TD 24 hr patch 1 patch ( Transdermal MAR Unhold 1/19/21 1839)   oxyCODONE (ROXICODONE) IR tablet 10 mg (10 mg Oral Given 1/19/21 2109)   oxyCODONE (ROXICODONE) IR tablet 5 mg (5 mg Oral Given 1/20/21 0104)   acetaminophen (TYLENOL) tablet 650 mg ( Oral MAR Unhold 1/19/21 1839)   ondansetron (ZOFRAN) injection 4 mg (4 mg Intravenous Given 1/19/21 2110)   HYDROmorphone (DILAUDID) injection 0 5 mg (0 5 mg Intravenous Given 1/20/21 0420)   lactated ringers infusion (125 mL/hr Intravenous New Bag 1/20/21 0420)   sodium chloride 0 9 % bolus 1,000 mL (0 mL Intravenous Stopped 1/19/21 1443)   ondansetron (ZOFRAN) injection 4 mg (4 mg Intravenous Given 1/19/21 1334)   pantoprazole (PROTONIX) injection 40 mg (40 mg Intravenous Given 1/19/21 1335)   sodium chloride 0 9 % bolus 1,000 mL (0 mL Intravenous Stopped 1/19/21 1613)   iohexol (OMNIPAQUE) 350 MG/ML injection (SINGLE-DOSE) 100 mL (100 mL Intravenous Given 1/19/21 1524)   metroNIDAZOLE (FLAGYL) IVPB (premix) 500 mg 100 mL (500 mg Intravenous New Bag 1/19/21 1706)   ceFAZolin (ANCEF) IVPB (premix in dextrose) 2,000 mg 50 mL (2,000 mg Intravenous New Bag 1/19/21 1613)   fentanyl citrate (PF) 100 MCG/2ML 50 mcg (50 mcg Intravenous Given 1/19/21 1624)   fentaNYL (SUBLIMAZE) injection 25 mcg (25 mcg Intravenous Given 1/19/21 1934)       Diagnostic Studies  Results Reviewed     Procedure Component Value Units Date/Time    Manual Differential (Non Wam) [[id number]]  (Abnormal) Collected: 01/19/21 1334    Lab Status: Final result Specimen: Blood from Arm, Left Updated: 01/19/21 1429     Segmented % 77 %      Bands % 1 %      Lymphocytes % 9 %      Monocytes % 9 %      Eosinophils, % 1 %      Basophils % 1 %      Atypical Lymphocytes % 2 %      Neutrophils Absolute 15 13 Thousand/uL      Lymphocytes Absolute 1 75 Thousand/uL      Monocytes Absolute 1 75 Thousand/uL      Eosinophils Absolute 0 19 Thousand/uL      Basophils Absolute 0 19 Thousand/uL      Total Counted 100     RBC Morphology Normal     Platelet Estimate Adequate    Comprehensive metabolic panel [[id number]]  (Abnormal) Collected: 01/19/21 1334    Lab Status: Final result Specimen: Blood from Arm, Left Updated: 01/19/21 1428     Sodium 141 mmol/L      Potassium 4 0 mmol/L      Chloride 100 mmol/L      CO2 31 mmol/L      ANION GAP 10 mmol/L      BUN 16 mg/dL      Creatinine 0 88 mg/dL      Glucose 103 mg/dL      Calcium 10 1 mg/dL      AST 21 U/L      ALT 10 U/L      Alkaline Phosphatase 75 U/L      Total Protein 8 9 g/dL      Albumin 4 9 g/dL      Total Bilirubin 0 90 mg/dL      eGFR 118 ml/min/1 73sq m     Narrative:      [place] guidelines for Chronic Kidney Disease (CKD):     Stage 1 with normal or high GFR (GFR > 90 mL/min/1 73 square meters)    Stage 2 Mild CKD (GFR = 60-89 mL/min/1 73 square meters)    Stage 3A Moderate CKD (GFR = 45-59 mL/min/1 73 square meters)    Stage 3B Moderate CKD (GFR = 30-44 mL/min/1 73 square meters)    Stage 4 Severe CKD (GFR = 15-29 mL/min/1 73 square meters)    Stage 5 End Stage CKD (GFR <15 mL/min/1 73 square meters)  Note: GFR calculation is accurate only with a steady state creatinine    CBC and differential [[id number]]  (Abnormal) Collected: 01/19/21 1334    Lab Status: Final result Specimen: Blood from Arm, Left Updated: 01/19/21 1411     WBC 19 40 Thousand/uL      RBC 4 92 Million/uL      Hemoglobin 15 3 g/dL      Hematocrit 47 1 %      MCV 96 fL      MCH 31 1 pg      MCHC 32 5 g/dL      RDW 13 2 %      MPV 9 0 fL      Platelets 787 Thousands/uL                  CT abdomen pelvis with contrast   Final Result by Heather Sparks MD (01/19 1547)   Acute appendicitis, uncomplicated               I personally discussed this study with Hue Bueno on 1/19/2021 at 3:43 PM                      Workstation performed: [id number]                    Procedures  Procedures         ED Course                                           MDM    Disposition  Final diagnoses:   Abdominal pain   Nausea and vomiting   Dehydration   Appendicitis     Time reflects when diagnosis was documented in both MDM as applicable and the Disposition within this note     Time User Action Codes Description Comment    1/19/2021  2:56 PM Ro Gemia Add [R10 9] Abdominal pain     1/19/2021  2:56 PM Ro Gemia Add [R11 2] Nausea and vomiting     1/19/2021  2:56 PM Or Gemia Add [E86 0] Dehydration     1/19/2021  4:10 PM Zoraida Baez Add [K35 80] Acute appendicitis, unspecified acute appendicitis type     1/19/2021  4:19 PM Dar Alonzo Appendicitis     1/19/2021  6:11 PM Terry Acosta Modify [K35 80] Acute appendicitis, unspecified acute appendicitis type       ED Disposition ED Disposition Condition Date/Time Comment    Admit Stable Tue Jan 19, 2021  4:19 PM Patient admitted to OR/Dr Baez        Follow-up Information     Follow up With Specialties Details Why Contact Info    Sarah Hernandez MD General Surgery Follow up in 2 week(s)  [address]  [place]  [phone]            Current Discharge Medication List      CONTINUE these medications which have NOT CHANGED    Details   benzonatate (TESSALON PERLES) 100 mg capsule Take 1 capsule (100 mg total) by mouth 3 (three) times a day as needed for cough  Qty: 20 capsule, Refills: 0    Associated Diagnoses: Cough      predniSONE 10 mg tablet Day 1: 6 tabs  Day 2: 5 tabs Day 3: 4 tabs  Day 4: 3 tabs  Day 5: 2 tabs  Day 6: 1 tab  Qty: 21 tablet, Refills: 0    Associated Diagnoses: Acute URI           No discharge procedures on file      PDMP Review     None          ED Provider  Electronically Signed by           Alvarez Castro DO  01/19/21 71 King Cho DO  01/20/21 5968

## 2021-01-19 NOTE — LETTER
[address] [facility]  [address] [place] [address]  [phone]  Dept: [phone]    January 20, 2021     Patient: Parviz Fleming III   YOB: 1995   Date of Visit: 1/19/2021       To Whom it May Concern:    Lisette Merino is under my professional care  He was seen in the hospital from 1/19/2021   to 01/20/21  He will be seen at a post op appointment and at that time his readiness to return to work will be evaluated       If you have any questions or concerns, please don't hesitate to call           Sincerely,          Louise Crane MD

## 2021-01-19 NOTE — DISCHARGE INSTR - AVS FIRST PAGE
Take tylenol or ibuprofen every 6 hours as needed for mild to moderate pain  For severe pain, take the narcotic pain medication every 4-6 hours    If you have not had a bowel movement for 2 days after your procedure you can take stool softeners like Colace and laxatives like Senna or milk of magnesia

## 2021-01-19 NOTE — H&P
H&P Exam - General Surgery   Luther Members III 30 y o  male MRN: [id number]  Unit/Bed#: ED 23 Encounter: [id number]    Assessment/Plan     Assessment:  Acute appendicitis  Plan:  Proceed with laparoscopic appendectomy, possible open  Discussed risks of the procedure including bleeding, infection, damage to surrounding structures  Discussed risk of open procedure and risk of drain placement  Informed consent was obtained  NPO for procedure  IV fluids, IV antibiotics  Pain control  Possible discharge in the morning    History of Present Illness     HPI:  Rhonda Leigh is a 30 y o  male who presents with 3 days abdominal cramping  He also has had nausea vomiting and decreased oral intake  Been taking over-the-counter medications to alleviate his symptoms for heartburn and nausea without improvement of symptoms  Pain is generalized and cramping with no radiation or specific region of severity  In the ED had a CT scan which showed a dilated inflamed appendix without evidence of perforation  He has a leukocytosis of 19, remainder of his labs are within normal limits  Review of Systems   Constitutional: Positive for chills  Negative for fever  Gastrointestinal: Positive for abdominal pain, nausea and vomiting  Negative for diarrhea  All other systems reviewed and are negative  Historical Information   History reviewed  No pertinent past medical history    Past Surgical History:   Procedure Laterality Date    FRACTURE SURGERY      rt elbow     Social History   Social History     Substance and Sexual Activity   Alcohol Use Yes    Comment: occ     Social History     Substance and Sexual Activity   Drug Use Yes    Types: Marijuana    Comment: occ     Social History     Tobacco Use   Smoking Status Current Every Day Smoker    Packs/day: 0 50    Types: Cigarettes   Smokeless Tobacco Never Used     E-Cigarette/Vaping    E-Cigarette Use Never User      E-Cigarette/Vaping Substances     Family History: non-contributory    Meds/Allergies   all medications and allergies reviewed  No Known Allergies    Objective   First Vitals:   Blood Pressure: 135/90 (01/19/21 1218)  Pulse: 85 (01/19/21 1218)  Temperature: (!) 97 3 °F (36 3 °C) (01/19/21 1218)  Respirations: 16 (01/19/21 1218)  Weight - Scale: 89 9 kg (198 lb 3 1 oz) (01/19/21 1218)  SpO2: 98 % (01/19/21 1218)    Current Vitals:   Blood Pressure: 135/90 (01/19/21 1218)  Pulse: 85 (01/19/21 1218)  Temperature: (!) 97 3 °F (36 3 °C) (01/19/21 1218)  Respirations: 16 (01/19/21 1218)  Weight - Scale: 89 9 kg (198 lb 3 1 oz) (01/19/21 1218)  SpO2: 98 % (01/19/21 1218)      Intake/Output Summary (Last 24 hours) at 1/19/2021 1720  Last data filed at 1/19/2021 1613  Gross per 24 hour   Intake 2000 ml   Output --   Net 2000 ml       Invasive Devices     Peripheral Intravenous Line            Peripheral IV 01/19/21 Left Antecubital less than 1 day                Physical Exam  Vitals signs reviewed  Constitutional:       General: He is not in acute distress  Appearance: Normal appearance  He is normal weight  HENT:      Head: Normocephalic and atraumatic  Eyes:      Extraocular Movements: Extraocular movements intact  Conjunctiva/sclera: Conjunctivae normal       Pupils: Pupils are equal, round, and reactive to light  Neck:      Musculoskeletal: Normal range of motion and neck supple  Cardiovascular:      Rate and Rhythm: Normal rate and regular rhythm  Pulmonary:      Effort: Pulmonary effort is normal       Breath sounds: Normal breath sounds  Abdominal:      General: Abdomen is flat  Palpations: Abdomen is soft  Tenderness: There is generalized abdominal tenderness  There is no guarding or rebound  Musculoskeletal: Normal range of motion  General: No swelling or tenderness  Skin:     General: Skin is warm and dry  Neurological:      General: No focal deficit present        Mental Status: He is alert and oriented to person, place, and time  Psychiatric:         Mood and Affect: Mood normal          Behavior: Behavior normal          Thought Content: Thought content normal          Judgment: Judgment normal          Lab Results:   I have personally reviewed pertinent lab results  , CBC:   Lab Results   Component Value Date    WBC 19 40 (H) 01/19/2021    HGB 15 3 01/19/2021    HCT 47 1 01/19/2021    MCV 96 01/19/2021     01/19/2021    MCH 31 1 01/19/2021    MCHC 32 5 01/19/2021    RDW 13 2 01/19/2021    MPV 9 0 01/19/2021   , CMP:   Lab Results   Component Value Date    SODIUM 141 01/19/2021    K 4 0 01/19/2021     01/19/2021    CO2 31 (H) 01/19/2021    BUN 16 01/19/2021    CREATININE 0 88 01/19/2021    CALCIUM 10 1 01/19/2021    AST 21 01/19/2021    ALT 10 01/19/2021    ALKPHOS 75 01/19/2021    EGFR 118 01/19/2021     Imaging: I have personally reviewed pertinent reports  and I have personally reviewed pertinent films in PACS  EKG, Pathology, and Other Studies: I have personally reviewed pertinent reports

## 2021-01-19 NOTE — OP NOTE
OPERATIVE REPORT  PATIENT NAME: Kim Dejesus III    :  1995  MRN: [id number]  Pt Location:  OR ROOM 08    SURGERY DATE: 2021    Surgeon(s) and Role:     * Rahul Henley MD - Primary  Logan Enriquez PA-c Assisting     Preop Diagnosis:  Acute appendicitis, unspecified acute appendicitis type [K35 80]    Post-Op Diagnosis Codes:     * Acute appendicitis, unspecified acute appendicitis type [K35 80]    Procedure(s) (LRB):  APPENDECTOMY LAPAROSCOPIC (N/A)    Specimen(s):  ID Type Source Tests Collected by Time Destination   1 : appendix Tissue Appendix TISSUE EXAM Rahul Henley MD 2021 1810        Estimated Blood Loss:   Minimal    Drains:  * No LDAs found *    Anesthesia Type:   General    Operative Indications:  Acute appendicitis, unspecified acute appendicitis type [K35 80]      Operative Findings:  Acute suppurative appendicitis    Complications:   None    Procedure and Technique:  Patient was identified in the preoperative holding area and taken back to the operating room  The patient was positioned supine on the operating room table  General anesthesia was then induced and the patient was prepped and draped in the standard sterile surgical fashion  Preoperative time-out was held confirming the correct patient, correct procedure and that all necessary equipment and personnel were present within the operating room  Preoperative antibiotics were administered prior to incision  Incision was made just below the umbilicus with a scalpel and a Veress needle was inserted into the abdomen  The abdomen was insufflated to 15 mmHg  Veress needle was then removed and a 5 mm port placed through this incision  Laparoscope was inserted into the port  Intra-abdominal contents were inspected and there was no trauma from port or needle placement  A 5 mm port was then placed in the suprapubic region and a 12 mm port in the left lower quadrant under direct visualization in similar fashion  Atraumatic graspers were used to bluntly manipulate the bowel in the right lower quadrant, identifying the cecum and the terminal ileum  The ileal rudder was grasped and the appendix was identified  The appendix was significantly inflamed and dilated  The appendix was grasped with an atraumatic grasper and manipulated such that the mesoappendix was accessible  The Harmonic scalpel was used to dissect through the mesoappendix down to the appendix base  A 5 mm hemoclip was used to secure the appendiceal artery  The appendix base was free of any inflammation  An Endo-ALESSANDRA stapler with a white load was then used to transect the appendix at the base  The appendix then placed into an Endo-Catch bag and removed from the abdomen  The field was irrigated and suctioned  The staple line was examined and was intact and hemostatic  The 12 mm port was then removed and an 0 vicryl suture on a suture closure device was used to close the fascia  The remaining trocars were then removed and the abdomen was allowed to desufflate  Local anesthetic was injected along each of the port sites  4-0 Monocryl suture was then used to close the skin in subcuticular fashion  Surgical glue was then applied  The patient was then awoken from general anesthesia and taken to the postoperative Care Unit  in good condition  All counts were correct at the end of the case       I was present for the entire procedure, A qualified resident physician was not available and A physician assistant was required during the procedure for retraction tissue handling,dissection and suturing    Patient Disposition:  PACU  and hemodynamically stable    SIGNATURE: Adelia Smalls MD  DATE: January 19, 2021  TIME: 6:37 PM

## 2021-01-19 NOTE — DISCHARGE INSTRUCTIONS
Laparoscopic Appendectomy   WHAT YOU NEED TO KNOW:   Laparoscopic appendectomy is surgery to remove your appendix  During this surgery, small incisions are made in your abdomen  A small scope and special tools are inserted through these incisions  A scope is a flexible tube with a light and camera on the end  DISCHARGE INSTRUCTIONS:   Medicines:   · Pain medicine: You may need medicine to take away or decrease pain  ¨ Learn how to take your medicine  Ask what medicine and how much you should take  Be sure you know how, when, and how often to take it  ¨ Do not wait until the pain is severe before you take your medicine  Tell caregivers if your pain does not decrease  ¨ Pain medicine can make you dizzy or sleepy  Prevent falls by calling someone when you get out of bed or if you need help  · Antibiotics: This medicine is given to fight or prevent an infection caused by bacteria  Always take your antibiotics exactly as ordered by your healthcare provider  Do not stop taking your medicine unless directed by your healthcare provider  Never save antibiotics or take leftover antibiotics that were given to you for another illness  · Take your medicine as directed  Contact your healthcare provider if you think your medicine is not helping or if you have side effects  Tell him or her if you are allergic to any medicine  Keep a list of the medicines, vitamins, and herbs you take  Include the amounts, and when and why you take them  Bring the list or the pill bottles to follow-up visits  Carry your medicine list with you in case of an emergency  Follow up with your healthcare provider as directed:  Write down your questions so you remember to ask them during your visits  Eat healthy foods:  Choose healthy foods from all the food groups every day  Include whole-grain bread, cereal, rice, and pasta  Eat a variety of fruits and vegetables, including dark green and orange vegetables   Include dairy products such as low-fat milk, yogurt, and cheese  Choose protein sources, such as lean beef and chicken, fish, beans, eggs, and nuts  Ask how many servings of fats, oils, and sweets you should have each day, and if you need to be on a special diet  Wound care:  When you are allowed to bathe or shower, carefully wash the incisions with soap and water  If you have bandages, change them any time they get wet or dirty  Ask your healthcare provider for more information about wound care  Contact your healthcare provider if:   · You are not able to have a bowel movement  · You have a fever  · You have chills, a cough, or feel weak and achy  · You have nausea or vomiting  · You have questions or concerns about your surgery, condition, or care  Seek care immediately or call 911 if:   · Blood soaks through your bandage  · You feel full and cannot burp or vomit  · You have pus or a foul-smelling odor coming from your wound  · Your vomit is greenish, looks like coffee grounds, or has blood in it  © 2017 [facility] Information is for End User's use only and may not be sold, redistributed or otherwise used for commercial purposes  All illustrations and images included in CareNotes® are the copyrighted property of [employer]  or Seng Ortiz  The above information is an  only  It is not intended as medical advice for individual conditions or treatments  Talk to your doctor, nurse or pharmacist before following any medical regimen to see if it is safe and effective for you

## 2021-01-19 NOTE — Clinical Note
Mariaa Rosen was seen and treated in our emergency department on 1/19/2021  Diagnosis:     Marquis Hills  may return to work on return date  He may return on this date: 01/21/2021         If you have any questions or concerns, please don't hesitate to call        Cong García DO    ______________________________           _______________          _______________  Hospital Representative                              Date                                Time

## 2021-01-19 NOTE — ANESTHESIA PREPROCEDURE EVALUATION
Procedure:  APPENDECTOMY LAPAROSCOPIC (N/A Abdomen)    Relevant Problems   Other   (+) Mass of left testicle        Physical Exam    Airway    Mallampati score: II  TM Distance: >3 FB  Neck ROM: full     Dental   No notable dental hx     Cardiovascular  Cardiovascular exam normal    Pulmonary  Pulmonary exam normal     Other Findings        Anesthesia Plan  ASA Score- 2 Emergent    Anesthesia Type- general with ASA Monitors  Additional Monitors:   Airway Plan:           Plan Factors-Exercise tolerance (METS): >4 METS  Chart reviewed  Induction- intravenous  Postoperative Plan-     Informed Consent- Anesthetic plan and risks discussed with patient  I personally reviewed this patient with the CRNA  Discussed and agreed on the Anesthesia Plan with the CRNA  Amparo Dietz

## 2021-01-19 NOTE — ANESTHESIA POSTPROCEDURE EVALUATION
Post-Op Assessment Note    CV Status:  Stable  Pain Score: 3    Pain management: adequate     Mental Status:  Alert and awake   Hydration Status:  Stable   PONV Controlled:  None   Airway Patency:  Patent      Post Op Vitals Reviewed: Yes      Staff: Anesthesiologist         No complications documented      BP      Temp     Pulse     Resp      SpO2

## 2021-01-20 VITALS
BODY MASS INDEX: 30.04 KG/M2 | RESPIRATION RATE: 18 BRPM | HEART RATE: 63 BPM | SYSTOLIC BLOOD PRESSURE: 138 MMHG | OXYGEN SATURATION: 99 % | DIASTOLIC BLOOD PRESSURE: 89 MMHG | HEIGHT: 68 IN | WEIGHT: 198.19 LBS | TEMPERATURE: 98.3 F

## 2021-01-20 RX ORDER — OXYCODONE HYDROCHLORIDE 5 MG/1
5 TABLET ORAL EVERY 4 HOURS PRN
Qty: 16 TABLET | Refills: 0 | Status: SHIPPED | OUTPATIENT
Start: 2021-01-20 | End: 2021-01-30

## 2021-01-20 RX ORDER — ACETAMINOPHEN 325 MG/1
650 TABLET ORAL EVERY 6 HOURS PRN
Qty: 20 TABLET | Refills: 0 | Status: SHIPPED | OUTPATIENT
Start: 2021-01-20

## 2021-01-20 RX ORDER — KETOROLAC TROMETHAMINE 30 MG/ML
15 INJECTION, SOLUTION INTRAMUSCULAR; INTRAVENOUS EVERY 6 HOURS SCHEDULED
Status: DISCONTINUED | OUTPATIENT
Start: 2021-01-20 | End: 2021-01-20 | Stop reason: HOSPADM

## 2021-01-20 RX ADMIN — OXYCODONE HYDROCHLORIDE 5 MG: 5 TABLET ORAL at 08:01

## 2021-01-20 RX ADMIN — SODIUM CHLORIDE, SODIUM LACTATE, POTASSIUM CHLORIDE, AND CALCIUM CHLORIDE 125 ML/HR: .6; .31; .03; .02 INJECTION, SOLUTION INTRAVENOUS at 04:20

## 2021-01-20 RX ADMIN — KETOROLAC TROMETHAMINE 15 MG: 30 INJECTION, SOLUTION INTRAMUSCULAR; INTRAVENOUS at 11:01

## 2021-01-20 RX ADMIN — HYDROMORPHONE HYDROCHLORIDE 0.5 MG: 1 INJECTION, SOLUTION INTRAMUSCULAR; INTRAVENOUS; SUBCUTANEOUS at 04:20

## 2021-01-20 RX ADMIN — OXYCODONE HYDROCHLORIDE 5 MG: 5 TABLET ORAL at 01:04

## 2021-01-20 NOTE — PLAN OF CARE
Problem: Potential for Falls  Goal: Patient will remain free of falls  Description: INTERVENTIONS:  - Assess patient frequently for physical needs  -  Identify cognitive and physical deficits and behaviors that affect risk of falls  -  [place] fall precautions as indicated by assessment   - Educate patient/family on patient safety including physical limitations  - Instruct patient to call for assistance with activity based on assessment  - Modify environment to reduce risk of injury  - Consider OT/PT consult to assist with strengthening/mobility  Outcome: Adequate for Discharge     Problem: Nutrition/Hydration-ADULT  Goal: Nutrient/Hydration intake appropriate for improving, restoring or maintaining nutritional needs  Description: Monitor and assess patient's nutrition/hydration status for malnutrition  Collaborate with interdisciplinary team and initiate plan and interventions as ordered  Monitor patient's weight and dietary intake as ordered or per policy  Utilize nutrition screening tool and intervene as necessary  Determine patient's food preferences and provide high-protein, high-caloric foods as appropriate       INTERVENTIONS:  - Monitor oral intake, urinary output, labs, and treatment plans  - Assess nutrition and hydration status and recommend course of action  - Evaluate amount of meals eaten  - Assist patient with eating if necessary   - Allow adequate time for meals  - Recommend/ encourage appropriate diets, oral nutritional supplements, and vitamin/mineral supplements  - Order, calculate, and assess calorie counts as needed  - Recommend, monitor, and adjust tube feedings and TPN/PPN based on assessed needs  - Assess need for intravenous fluids  - Provide specific nutrition/hydration education as appropriate  - Include patient/family/caregiver in decisions related to nutrition  Outcome: Adequate for Discharge     Problem: PAIN - ADULT  Goal: Verbalizes/displays adequate comfort level or baseline comfort level  Description: Interventions:  - Encourage patient to monitor pain and request assistance  - Assess pain using appropriate pain scale  - Administer analgesics based on type and severity of pain and evaluate response  - Implement non-pharmacological measures as appropriate and evaluate response  - Consider cultural and social influences on pain and pain management  - Notify physician/advanced practitioner if interventions unsuccessful or patient reports new pain  Outcome: Adequate for Discharge     Problem: INFECTION - ADULT  Goal: Absence or prevention of progression during hospitalization  Description: INTERVENTIONS:  - Assess and monitor for signs and symptoms of infection  - Monitor lab/diagnostic results  - Monitor all insertion sites, i e  indwelling lines, tubes, and drains  - Monitor endotracheal if appropriate and nasal secretions for changes in amount and color  - [place] appropriate cooling/warming therapies per order  - Administer medications as ordered  - Instruct and encourage patient and family to use good hand hygiene technique  - Identify and instruct in appropriate isolation precautions for identified infection/condition  Outcome: Adequate for Discharge  Goal: Absence of fever/infection during neutropenic period  Description: INTERVENTIONS:  - Monitor WBC    Outcome: Adequate for Discharge     Problem: SAFETY ADULT  Goal: Patient will remain free of falls  Description: INTERVENTIONS:  - Assess patient frequently for physical needs  -  Identify cognitive and physical deficits and behaviors that affect risk of falls    -  [place] fall precautions as indicated by assessment   - Educate patient/family on patient safety including physical limitations  - Instruct patient to call for assistance with activity based on assessment  - Modify environment to reduce risk of injury  - Consider OT/PT consult to assist with strengthening/mobility  Outcome: Adequate for Discharge  Goal: Maintain or return to baseline ADL function  Description: INTERVENTIONS:  -  Assess patient's ability to carry out ADLs; assess patient's baseline for ADL function and identify physical deficits which impact ability to perform ADLs (bathing, care of mouth/teeth, toileting, grooming, dressing, etc )  - Assess/evaluate cause of self-care deficits   - Assess range of motion  - Assess patient's mobility; develop plan if impaired  - Assess patient's need for assistive devices and provide as appropriate  - Encourage maximum independence but intervene and supervise when necessary  - Involve family in performance of ADLs  - Assess for home care needs following discharge   - Consider OT consult to assist with ADL evaluation and planning for discharge  - Provide patient education as appropriate  Outcome: Adequate for Discharge  Goal: Maintain or return mobility status to optimal level  Description: INTERVENTIONS:  - Assess patient's baseline mobility status (ambulation, transfers, stairs, etc )    - Identify cognitive and physical deficits and behaviors that affect mobility  - Identify mobility aids required to assist with transfers and/or ambulation (gait belt, sit-to-stand, lift, walker, cane, etc )  - [place] fall precautions as indicated by assessment  - Record patient progress and toleration of activity level on Mobility SBAR; progress patient to next Phase/Stage  - Instruct patient to call for assistance with activity based on assessment  - Consider rehabilitation consult to assist with strengthening/weightbearing, etc   Outcome: Adequate for Discharge     Problem: DISCHARGE PLANNING  Goal: Discharge to home or other facility with appropriate resources  Description: INTERVENTIONS:  - Identify barriers to discharge w/patient and caregiver  - Arrange for needed discharge resources and transportation as appropriate  - Identify discharge learning needs (meds, wound care, etc )  - Arrange for interpretive services to assist at discharge as needed  - Refer to Case Management Department for coordinating discharge planning if the patient needs post-hospital services based on physician/advanced practitioner order or complex needs related to functional status, cognitive ability, or social support system  Outcome: Adequate for Discharge     Problem: Knowledge Deficit  Goal: Patient/family/caregiver demonstrates understanding of disease process, treatment plan, medications, and discharge instructions  Description: Complete learning assessment and assess knowledge base    Interventions:  - Provide teaching at level of understanding  - Provide teaching via preferred learning methods  Outcome: Adequate for Discharge     Problem: GASTROINTESTINAL - ADULT  Goal: Minimal or absence of nausea and/or vomiting  Description: INTERVENTIONS:  - Administer IV fluids if ordered to ensure adequate hydration  - Maintain NPO status until nausea and vomiting are resolved  - Nasogastric tube if ordered  - Administer ordered antiemetic medications as needed  - Provide nonpharmacologic comfort measures as appropriate  - Advance diet as tolerated, if ordered  - Consider nutrition services referral to assist patient with adequate nutrition and appropriate food choices  Outcome: Adequate for Discharge  Goal: Maintains or returns to baseline bowel function  Description: INTERVENTIONS:  - Assess bowel function  - Encourage oral fluids to ensure adequate hydration  - Administer IV fluids if ordered to ensure adequate hydration  - Administer ordered medications as needed  - Encourage mobilization and activity  - Consider nutritional services referral to assist patient with adequate nutrition and appropriate food choices  Outcome: Adequate for Discharge  Goal: Maintains adequate nutritional intake  Description: INTERVENTIONS:  - Monitor percentage of each meal consumed  - Identify factors contributing to decreased intake, treat as appropriate  - Assist with meals as needed  - Monitor I&O, weight, and lab values if indicated  - Obtain nutrition services referral as needed  Outcome: Adequate for Discharge

## 2021-01-20 NOTE — PLAN OF CARE
Problem: Potential for Falls  Goal: Patient will remain free of falls  Description: INTERVENTIONS:  - Assess patient frequently for physical needs  -  Identify cognitive and physical deficits and behaviors that affect risk of falls  -  [place] fall precautions as indicated by assessment   - Educate patient/family on patient safety including physical limitations  - Instruct patient to call for assistance with activity based on assessment  - Modify environment to reduce risk of injury  - Consider OT/PT consult to assist with strengthening/mobility  Outcome: Progressing     Problem: Nutrition/Hydration-ADULT  Goal: Nutrient/Hydration intake appropriate for improving, restoring or maintaining nutritional needs  Description: Monitor and assess patient's nutrition/hydration status for malnutrition  Collaborate with interdisciplinary team and initiate plan and interventions as ordered  Monitor patient's weight and dietary intake as ordered or per policy  Utilize nutrition screening tool and intervene as necessary  Determine patient's food preferences and provide high-protein, high-caloric foods as appropriate       INTERVENTIONS:  - Monitor oral intake, urinary output, labs, and treatment plans  - Assess nutrition and hydration status and recommend course of action  - Evaluate amount of meals eaten  - Assist patient with eating if necessary   - Allow adequate time for meals  - Recommend/ encourage appropriate diets, oral nutritional supplements, and vitamin/mineral supplements  - Order, calculate, and assess calorie counts as needed  - Recommend, monitor, and adjust tube feedings and TPN/PPN based on assessed needs  - Assess need for intravenous fluids  - Provide specific nutrition/hydration education as appropriate  - Include patient/family/caregiver in decisions related to nutrition  Outcome: Progressing     Problem: PAIN - ADULT  Goal: Verbalizes/displays adequate comfort level or baseline comfort level  Description: Interventions:  - Encourage patient to monitor pain and request assistance  - Assess pain using appropriate pain scale  - Administer analgesics based on type and severity of pain and evaluate response  - Implement non-pharmacological measures as appropriate and evaluate response  - Consider cultural and social influences on pain and pain management  - Notify physician/advanced practitioner if interventions unsuccessful or patient reports new pain  Outcome: Progressing     Problem: INFECTION - ADULT  Goal: Absence or prevention of progression during hospitalization  Description: INTERVENTIONS:  - Assess and monitor for signs and symptoms of infection  - Monitor lab/diagnostic results  - Monitor all insertion sites, i e  indwelling lines, tubes, and drains  - Monitor endotracheal if appropriate and nasal secretions for changes in amount and color  - [place] appropriate cooling/warming therapies per order  - Administer medications as ordered  - Instruct and encourage patient and family to use good hand hygiene technique  - Identify and instruct in appropriate isolation precautions for identified infection/condition  Outcome: Progressing  Goal: Absence of fever/infection during neutropenic period  Description: INTERVENTIONS:  - Monitor WBC    Outcome: Progressing     Problem: SAFETY ADULT  Goal: Patient will remain free of falls  Description: INTERVENTIONS:  - Assess patient frequently for physical needs  -  Identify cognitive and physical deficits and behaviors that affect risk of falls    -  [place] fall precautions as indicated by assessment   - Educate patient/family on patient safety including physical limitations  - Instruct patient to call for assistance with activity based on assessment  - Modify environment to reduce risk of injury  - Consider OT/PT consult to assist with strengthening/mobility  Outcome: Progressing  Goal: Maintain or return to baseline ADL function  Description: INTERVENTIONS:  -  Assess patient's ability to carry out ADLs; assess patient's baseline for ADL function and identify physical deficits which impact ability to perform ADLs (bathing, care of mouth/teeth, toileting, grooming, dressing, etc )  - Assess/evaluate cause of self-care deficits   - Assess range of motion  - Assess patient's mobility; develop plan if impaired  - Assess patient's need for assistive devices and provide as appropriate  - Encourage maximum independence but intervene and supervise when necessary  - Involve family in performance of ADLs  - Assess for home care needs following discharge   - Consider OT consult to assist with ADL evaluation and planning for discharge  - Provide patient education as appropriate  Outcome: Progressing  Goal: Maintain or return mobility status to optimal level  Description: INTERVENTIONS:  - Assess patient's baseline mobility status (ambulation, transfers, stairs, etc )    - Identify cognitive and physical deficits and behaviors that affect mobility  - Identify mobility aids required to assist with transfers and/or ambulation (gait belt, sit-to-stand, lift, walker, cane, etc )  - [place] fall precautions as indicated by assessment  - Record patient progress and toleration of activity level on Mobility SBAR; progress patient to next Phase/Stage  - Instruct patient to call for assistance with activity based on assessment  - Consider rehabilitation consult to assist with strengthening/weightbearing, etc   Outcome: Progressing     Problem: DISCHARGE PLANNING  Goal: Discharge to home or other facility with appropriate resources  Description: INTERVENTIONS:  - Identify barriers to discharge w/patient and caregiver  - Arrange for needed discharge resources and transportation as appropriate  - Identify discharge learning needs (meds, wound care, etc )  - Arrange for interpretive services to assist at discharge as needed  - Refer to Case Management Department for coordinating discharge planning if the patient needs post-hospital services based on physician/advanced practitioner order or complex needs related to functional status, cognitive ability, or social support system  Outcome: Progressing     Problem: Knowledge Deficit  Goal: Patient/family/caregiver demonstrates understanding of disease process, treatment plan, medications, and discharge instructions  Description: Complete learning assessment and assess knowledge base    Interventions:  - Provide teaching at level of understanding  - Provide teaching via preferred learning methods  Outcome: Progressing     Problem: GASTROINTESTINAL - ADULT  Goal: Minimal or absence of nausea and/or vomiting  Description: INTERVENTIONS:  - Administer IV fluids if ordered to ensure adequate hydration  - Maintain NPO status until nausea and vomiting are resolved  - Nasogastric tube if ordered  - Administer ordered antiemetic medications as needed  - Provide nonpharmacologic comfort measures as appropriate  - Advance diet as tolerated, if ordered  - Consider nutrition services referral to assist patient with adequate nutrition and appropriate food choices  Outcome: Progressing  Goal: Maintains or returns to baseline bowel function  Description: INTERVENTIONS:  - Assess bowel function  - Encourage oral fluids to ensure adequate hydration  - Administer IV fluids if ordered to ensure adequate hydration  - Administer ordered medications as needed  - Encourage mobilization and activity  - Consider nutritional services referral to assist patient with adequate nutrition and appropriate food choices  Outcome: Progressing  Goal: Maintains adequate nutritional intake  Description: INTERVENTIONS:  - Monitor percentage of each meal consumed  - Identify factors contributing to decreased intake, treat as appropriate  - Assist with meals as needed  - Monitor I&O, weight, and lab values if indicated  - Obtain nutrition services referral as needed  Outcome: Progressing

## 2021-01-20 NOTE — NURSING NOTE
Received pt from PACU to room 708-1  On arrival pt AAOx4, VSS, abdomen soft with hypoactive bowel sounds  Incision sites are C/D/I with no drainage noted  Pt tolerating PO fluids at this time with no N/V present  All items remain within reach, no further concerns at this time

## 2021-01-20 NOTE — UTILIZATION REVIEW
Initial Clinical Review    Admission: Date/Time/Statement:   Admission Orders (From admission, onward)     Ordered        01/19/21 2038  Place in Observation  Once                   Orders Placed This Encounter   Procedures    Place in Observation     Standing Status:   Standing     Number of Occurrences:   1     Order Specific Question:   Level of Care     Answer:   Med Surg [16]     ED Arrival Information     Expected Arrival Acuity Means of Arrival Escorted By Service Admission Type    - 1/19/2021 12:09 Urgent Walk-In Self Surgery-General Urgent    Arrival Complaint    abdominal pain        Chief Complaint   Patient presents with    Abdominal Pain     abd pain and vomiting for 3 days     Assessment/Plan: 30year old male, presented to the ED @ [address] [place] [address], from home via walk in  Admitted as Observation due to Acute Appendicitis  3 day h/o abs cramping along with N/V and decreased PO intake  + for chills  In the ED had a CT scan which showed a dilated inflamed appendix without evidence of perforation  WBC 19  NPO  IV flds  IV abx  Analgesia PRN  SURGERY DATE: 1/19/2021  Procedure(s) (LRB):  APPENDECTOMY LAPAROSCOPIC (N/A)  Anesthesia Type:   General  Operative Findings:  Acute suppurative appendicitis    01/20/2021  Progress Note (POD # 1):  S/P appendectomy  Laparoscopic  Surgical soft diet  Up as tolerated  Uriel SCDs  Analgesia PRN              ED Triage Vitals   Temperature Pulse Respirations Blood Pressure SpO2   01/19/21 1218 01/19/21 1218 01/19/21 1218 01/19/21 1218 01/19/21 1218   (!) 97 3 °F (36 3 °C) 85 16 135/90 98 %      Temp Source Heart Rate Source Patient Position - Orthostatic VS BP Location FiO2 (%)   01/19/21 1955 01/19/21 1218 01/19/21 1218 01/19/21 1218 --   Temporal Monitor Sitting Left arm       Pain Score       01/19/21 1218       8          Wt Readings from Last 1 Encounters:   01/19/21 89 9 kg (198 lb 3 1 oz)     Additional Vital Signs:   Date/Time  Temp  Pulse  Resp BP  MAP (mmHg)  SpO2  O2 Flow Rate (L/min)  O2 Device  Patient Position - Orthostatic VS   01/20/21 0800  97 4 °F (36 3 °C)Abnormal   75  18  124/71  --  99 %  --  None (Room air)  Lying   01/20/21 0400  97 8 °F (36 6 °C)  70  22  140/87  93  97 %  --  None (Room air)  Lying   01/20/21 0100  98 1 °F (36 7 °C)  85  21  152/92  107  99 %  --  None (Room air)  Sitting   01/19/21 2330  98 9 °F (37 2 °C)  93  20  116/75  --  98 %  --  None (Room air)  Lying   01/19/21 2235  97 8 °F (36 6 °C)  87  18  126/78  78  99 %  --  None (Room air)  Lying   01/19/21 2135  97 6 °F (36 4 °C)  78  18  129/87  89  100 %  --  None (Room air)  Lying   01/19/21 2105  97 6 °F (36 4 °C)  66  18  138/81  88  98 %  --  None (Room air)  Lying   01/19/21 2035  97 8 °F (36 6 °C)  77  18  133/77  98  98 %  --  None (Room air)  Lying   01/19/21 2025  97 6 °F (36 4 °C)  73  18  131/86  89  99 %  --  None (Room air)  Lying   01/19/21 2010  97 6 °F (36 4 °C)  67  18  127/79  88  99 %  --  None (Room air)  Lying   01/19/21 1955  98 5 °F (36 9 °C)  65  18  121/71  89  98 %  --  None (Room air)  Lying   01/19/21 1937  --  63  16  119/68  --  97 %  --  None (Room air)  --   01/19/21 1934  --  66  16  --  --  98 %  --  None (Room air)  --   01/19/21 1929  --  64  16  127/77  --  98 %  --  None (Room air)  --   01/19/21 1923  --  68  16  --  --  98 %  --  None (Room air)  --   01/19/21 1915  --  61  16  121/77  --  100 %  2 L/min  Simple mask  --   01/19/21 1903  --  56  16  124/76  --  100 %  4 L/min  Simple mask  --   01/19/21 1850  98 °F (36 7 °C)  60  16  160/84  --  100 %  4 L/min  Simple mask  --     Date and Time Eye Opening Best Verbal Response Best Motor Response Rossy Coma Scale Score   01/19/21 2110 4 5 6 15     01/19/2021 @ 1540  CT abd/pel:  Acute appendicitis, uncomplicated       Pertinent Labs/Diagnostic Test Results:     Results from last 7 days   Lab Units 01/19/21  1334   WBC Thousand/uL 19 40*   HEMOGLOBIN g/dL 15 3   HEMATOCRIT % 47 1 PLATELETS Thousands/uL 309   TOTAL NEUT ABS Thousand/uL 15 13*   BANDS PCT % 1     Results from last 7 days   Lab Units 01/19/21  1334   SODIUM mmol/L 141   POTASSIUM mmol/L 4 0   CHLORIDE mmol/L 100   CO2 mmol/L 31*   ANION GAP mmol/L 10   BUN mg/dL 16   CREATININE mg/dL 0 88   EGFR ml/min/1 73sq m 118   CALCIUM mg/dL 10 1     Results from last 7 days   Lab Units 01/19/21  1334   AST U/L 21   ALT U/L 10   ALK PHOS U/L 75   TOTAL PROTEIN g/dL 8 9*   ALBUMIN g/dL 4 9   TOTAL BILIRUBIN mg/dL 0 90     Results from last 7 days   Lab Units 01/19/21  1334   GLUCOSE RANDOM mg/dL 103*     Results from last 7 days   Lab Units 01/19/21  1334   TOTAL COUNTED  100     ED Treatment:   Medication Administration from 01/19/2021 1209 to 01/19/2021 1739       Date/Time Order Dose Route Action     01/19/2021 1334 sodium chloride 0 9 % bolus 1,000 mL 1,000 mL Intravenous New Bag     01/19/2021 1334 ondansetron (ZOFRAN) injection 4 mg 4 mg Intravenous Given     01/19/2021 1335 pantoprazole (PROTONIX) injection 40 mg 40 mg Intravenous Given     01/19/2021 1451 sodium chloride 0 9 % bolus 1,000 mL 1,000 mL Intravenous New Bag     01/19/2021 1524 iohexol (OMNIPAQUE) 350 MG/ML injection (SINGLE-DOSE) 100 mL 100 mL Intravenous Given     01/19/2021 1706 metroNIDAZOLE (FLAGYL) IVPB (premix) 500 mg 100 mL 500 mg Intravenous New Bag     01/19/2021 1613 ceFAZolin (ANCEF) IVPB (premix in dextrose) 2,000 mg 50 mL 2,000 mg Intravenous New Bag     01/19/2021 1624 fentanyl citrate (PF) 100 MCG/2ML 50 mcg 50 mcg Intravenous Given        History reviewed  No pertinent past medical history      Admitting Diagnosis: Dehydration [E86 0]  Appendicitis [K37]  Abdominal pain [R10 9]  Nausea and vomiting [R11 2]  Abdominal pain in male [R10 9]  Acute appendicitis, unspecified acute appendicitis type [K35 80]  Age/Sex: 30 y o  male  Admission Orders:  Scheduled Medications:  nicotine, 1 patch, Transdermal, Daily      Continuous IV Infusions:  lactated ringers, 125 mL/hr, Intravenous, Continuous  sodium chloride, 125 mL/hr, Intravenous, Continuous      PRN Meds:  acetaminophen, 650 mg, Oral, Q6H PRN  HYDROmorphone, 0 5 mg, Intravenous, Q3H PRN  X 1 dose 1/19; x 1 dose 1/20  ondansetron, 4 mg, Intravenous, Q4H PRN  X 1 dose 1/19  oxyCODONE, 10 mg, Oral, Q4H PRN x 1 dose 1/19  oxyCODONE, 5 mg, Oral, Q4H PRN  X 2 doses 1/20    Uriel SCDs    Network Utilization Review Department  ATTENTION: Please call with any questions or concerns to [phone] and carefully listen to the prompts so that you are directed to the right person  All voicemails are confidential   Anselmo Kaiser all requests for admission clinical reviews, approved or denied determinations and any other requests to dedicated fax number below belonging to the campus where the patient is receiving treatment   List of dedicated fax numbers for the Facilities:  [address] DENIALS (Administrative/Medical Necessity) [phone]   [address] (Maternity/NICU/Pediatrics) [phone]   [address] Kaity Matthew 8968 (Darius Rajput "[address]" 103) [address] [place] [address] Jeremy Jonnathan Nicholas 14[address] [place] [address] [phone]

## 2021-01-21 ENCOUNTER — TELEPHONE (OUTPATIENT)
Dept: FAMILY MEDICINE CLINIC | Facility: CLINIC | Age: 26
End: 2021-01-21

## 2021-02-03 ENCOUNTER — OFFICE VISIT (OUTPATIENT)
Dept: SURGERY | Facility: CLINIC | Age: 26
End: 2021-02-03

## 2021-02-03 VITALS
BODY MASS INDEX: 26.24 KG/M2 | HEIGHT: 73 IN | SYSTOLIC BLOOD PRESSURE: 128 MMHG | TEMPERATURE: 97.6 F | DIASTOLIC BLOOD PRESSURE: 76 MMHG | WEIGHT: 198 LBS | HEART RATE: 82 BPM

## 2021-02-03 DIAGNOSIS — Z98.890 POST-OPERATIVE STATE: Primary | ICD-10-CM

## 2021-02-03 PROCEDURE — 99024 POSTOP FOLLOW-UP VISIT: CPT | Performed by: SURGERY

## 2021-02-03 NOTE — PROGRESS NOTES
Assessment/Plan:   status post laparoscopic appendectomy-  Pathology was reviewed  Subcutaneous nodule likely secondary to inflammation possibly granuloma though with absorbable suture unlikely that it will per cyst   Advised him to follow up in 2-3 months if the pain and nodule have not dissipated  continue taking Advil for postsurgical pain   okay to return to work on 02/08/2021  No problem-specific Assessment & Plan notes found for this encounter  Diagnoses and all orders for this visit:    Post-operative state          Subjective:      Patient ID: Nickolas Higgins is a 30 y o  male  Returns for postop visit status post laparoscopic appendectomy  He is doing well, reports some incisional discomfort at the suprapubic and left lower quadrant incision as well as some pain that radiates down to his testicle on the left side  He is taking Advil for any discomfort  The following portions of the patient's history were reviewed and updated as appropriate: allergies, current medications, past family history, past medical history, past social history, past surgical history and problem list     Review of Systems   All other systems reviewed and are negative          Objective:      /76 (BP Location: Left arm, Patient Position: Sitting, Cuff Size: Adult)   Pulse 82   Temp 97 6 °F (36 4 °C) (Tympanic)   Ht 6' 1" (1 854 m)   Wt 89 8 kg (198 lb)   BMI 26 12 kg/m²          Physical Exam  Abdominal:          Comments:   Incisions are all clean, dry and intact and well healing

## 2021-08-05 DIAGNOSIS — L02.01 FACIAL ABSCESS: Primary | ICD-10-CM

## 2021-08-05 RX ORDER — CEPHALEXIN 500 MG/1
500 CAPSULE ORAL EVERY 8 HOURS SCHEDULED
Qty: 21 CAPSULE | Refills: 0 | Status: SHIPPED | OUTPATIENT
Start: 2021-08-05 | End: 2021-08-12

## 2021-12-23 ENCOUNTER — OFFICE VISIT (OUTPATIENT)
Dept: DERMATOLOGY | Facility: CLINIC | Age: 26
End: 2021-12-23
Payer: COMMERCIAL

## 2021-12-23 VITALS — WEIGHT: 185 LBS | BODY MASS INDEX: 24.52 KG/M2 | TEMPERATURE: 97.4 F | HEIGHT: 73 IN

## 2021-12-23 DIAGNOSIS — L73.0 ACNE SCARRING: ICD-10-CM

## 2021-12-23 DIAGNOSIS — L70.0 NODULAR ACNE: Primary | ICD-10-CM

## 2021-12-23 DIAGNOSIS — L70.0 ACNE VULGARIS: ICD-10-CM

## 2021-12-23 PROCEDURE — 3008F BODY MASS INDEX DOCD: CPT | Performed by: DERMATOLOGY

## 2021-12-23 PROCEDURE — 99204 OFFICE O/P NEW MOD 45 MIN: CPT | Performed by: DERMATOLOGY

## 2021-12-23 RX ORDER — METRONIDAZOLE 10 MG/G
GEL TOPICAL DAILY
Qty: 60 G | Refills: 3 | Status: SHIPPED | OUTPATIENT
Start: 2021-12-23

## 2021-12-23 RX ORDER — DOXYCYCLINE 100 MG/1
100 CAPSULE ORAL 2 TIMES DAILY
Qty: 60 CAPSULE | Refills: 2 | Status: SHIPPED | OUTPATIENT
Start: 2021-12-23 | End: 2022-02-21

## 2021-12-23 RX ORDER — DOXYCYCLINE HYCLATE 100 MG/1
CAPSULE ORAL
Qty: 60 CAPSULE | Refills: 3 | Status: CANCELLED | OUTPATIENT
Start: 2021-12-23 | End: 2021-12-23

## 2022-04-20 ENCOUNTER — TELEPHONE (OUTPATIENT)
Dept: FAMILY MEDICINE CLINIC | Facility: CLINIC | Age: 27
End: 2022-04-20

## 2022-04-20 NOTE — TELEPHONE ENCOUNTER
I called and left the Pt a message to katiee the office a call back  Upon review of our records he has not been seen since 2020  We would like to get him scheduled

## 2023-02-23 ENCOUNTER — OFFICE VISIT (OUTPATIENT)
Dept: FAMILY MEDICINE CLINIC | Facility: CLINIC | Age: 28
End: 2023-02-23

## 2023-02-23 VITALS
RESPIRATION RATE: 16 BRPM | WEIGHT: 192 LBS | HEART RATE: 100 BPM | OXYGEN SATURATION: 97 % | HEIGHT: 70 IN | TEMPERATURE: 96.9 F | BODY MASS INDEX: 27.49 KG/M2 | SYSTOLIC BLOOD PRESSURE: 118 MMHG | DIASTOLIC BLOOD PRESSURE: 78 MMHG

## 2023-02-23 DIAGNOSIS — H69.82 EUSTACHIAN TUBE DYSFUNCTION, LEFT: Primary | ICD-10-CM

## 2023-02-23 RX ORDER — PREDNISONE 20 MG/1
40 TABLET ORAL DAILY
Qty: 6 TABLET | Refills: 0 | Status: SHIPPED | OUTPATIENT
Start: 2023-02-23 | End: 2023-02-26

## 2023-02-23 NOTE — PATIENT INSTRUCTIONS
Start prednisone, this is the steroid  40mg daily for 3 days  Take with food  It's better to take it earlier in the day than later as it could keep you up at night  Do not mix with NSAIDs such as aleve, ibuprofen, advil, motrin  Start Flonase, this is an intranasal corticosteroid  This is 1-2 sprays each nostril once daily  Please be aware that this can cause nasal mucosa irritation  Stop using if you experience any nose bleeds  This can be used for allergy symptoms as well as ear discomfort/pressure  Can start over the counter allergy medication too  Please call the office if you are experiencing any worsening of symptoms or no symptom improvement

## 2023-02-23 NOTE — PROGRESS NOTES
Name: Lico Merino III      : 1995      MRN: [id number]  Encounter Provider: AJ Fraire  Encounter Date: 2023   Encounter department: [address] [place] [address] Road     1  Eustachian tube dysfunction, left  -     predniSONE 20 mg tablet; Take 2 tablets (40 mg total) by mouth daily for 3 days  Start prednisone, this is the steroid  40mg daily for 3 days  Take with food  It's better to take it earlier in the day than later as it could keep you up at night  Do not mix with NSAIDs such as aleve, ibuprofen, advil, motrin  Start Flonase, this is an intranasal corticosteroid  This is 1-2 sprays each nostril once daily  Please be aware that this can cause nasal mucosa irritation  Stop using if you experience any nose bleeds  This can be used for allergy symptoms as well as ear discomfort/pressure  Can start over the counter allergy medication too  Please call the office if you are experiencing any worsening of symptoms or no symptom improvement  Subjective      Here for evaluation of discomfort in left ear  Pain / ringing since last week in left ear  No injury to ear  No drainage with it  Has had wax build up in the past  He has tried cleaning his ear  Has mild runny nose this week but none last week  Does get allergies  Did clean ears today  Review of Systems   Constitutional: Negative for chills and fever  HENT: Positive for ear pain  Negative for ear discharge  Eyes: Negative for discharge  Respiratory: Negative for shortness of breath  Cardiovascular: Negative for chest pain  Gastrointestinal: Negative for constipation and diarrhea  Genitourinary: Negative for difficulty urinating  Musculoskeletal: Negative for joint swelling  Skin: Negative for rash  Neurological: Negative for headaches  Hematological: Negative for adenopathy  Psychiatric/Behavioral: The patient is not nervous/anxious          Current Outpatient Medications on File Prior to Visit   Medication Sig   • acetaminophen (TYLENOL) 325 mg tablet Take 2 tablets (650 mg total) by mouth every 6 (six) hours as needed for mild pain or fever   • metroNIDAZOLE (METROGEL) 1 % gel Apply topically daily       Objective     /78   Pulse 100   Temp (!) 96 9 °F (36 1 °C) (Temporal)   Resp 16   Ht 5' 10 47" (1 79 m)   Wt 87 1 kg (192 lb)   SpO2 97%   BMI 27 18 kg/m²     Physical Exam  Vitals and nursing note reviewed  Constitutional:       General: He is not in acute distress  Appearance: He is well-developed  He is not diaphoretic  HENT:      Head: Normocephalic and atraumatic  Right Ear: Tympanic membrane, ear canal and external ear normal  No drainage, swelling or tenderness  No middle ear effusion  There is no impacted cerumen  No foreign body  No hemotympanum  Tympanic membrane is not scarred, perforated, erythematous, retracted or bulging  Left Ear: External ear normal  No drainage, swelling or tenderness  A middle ear effusion is present  There is no impacted cerumen  No foreign body  No hemotympanum  Tympanic membrane is bulging (mild)  Tympanic membrane is not scarred, perforated, erythematous or retracted  Ears:      Comments: Exterior canal of left ear has small area of erythema likely due to cleaning ear/self inflicted  Rest of canal normal    Eyes:      General: Lids are normal          Right eye: No discharge  Left eye: No discharge  Conjunctiva/sclera: Conjunctivae normal    Pulmonary:      Effort: Pulmonary effort is normal  No respiratory distress  Musculoskeletal:         General: No deformity  Cervical back: Neck supple  Skin:     General: Skin is warm and dry  Neurological:      Mental Status: He is alert and oriented to person, place, and time  Psychiatric:         Speech: Speech normal          Behavior: Behavior normal          Thought Content:  Thought content normal          Judgment: Judgment normal        Doylene Regan, CRNP

## 2023-07-17 ENCOUNTER — OFFICE VISIT (OUTPATIENT)
Dept: FAMILY MEDICINE CLINIC | Facility: CLINIC | Age: 28
End: 2023-07-17
Payer: COMMERCIAL

## 2023-07-17 VITALS
HEIGHT: 71 IN | HEART RATE: 73 BPM | TEMPERATURE: 97.9 F | BODY MASS INDEX: 28.87 KG/M2 | SYSTOLIC BLOOD PRESSURE: 118 MMHG | WEIGHT: 206.2 LBS | OXYGEN SATURATION: 98 % | DIASTOLIC BLOOD PRESSURE: 76 MMHG

## 2023-07-17 DIAGNOSIS — R93.5 ABNORMAL CT OF THE ABDOMEN: Primary | ICD-10-CM

## 2023-07-17 DIAGNOSIS — M41.9 MILD SCOLIOSIS: ICD-10-CM

## 2023-07-17 DIAGNOSIS — C62.92 MALIGNANT NEOPLASM OF LEFT TESTIS, UNSPECIFIED WHETHER DESCENDED OR UNDESCENDED (HCC): ICD-10-CM

## 2023-07-17 DIAGNOSIS — G89.29 CHRONIC LEFT-SIDED LOW BACK PAIN WITH LEFT-SIDED SCIATICA: ICD-10-CM

## 2023-07-17 DIAGNOSIS — R10.9 ABDOMINAL CRAMPING: ICD-10-CM

## 2023-07-17 DIAGNOSIS — R91.8 PULMONARY NODULES: ICD-10-CM

## 2023-07-17 DIAGNOSIS — M54.42 CHRONIC LEFT-SIDED LOW BACK PAIN WITH LEFT-SIDED SCIATICA: ICD-10-CM

## 2023-07-17 DIAGNOSIS — K20.90 ESOPHAGITIS: ICD-10-CM

## 2023-07-17 PROBLEM — Z85.47 HISTORY OF TESTICULAR CANCER: Status: ACTIVE | Noted: 2023-07-17

## 2023-07-17 PROBLEM — C62.12 SEMINOMA OF DESCENDED LEFT TESTIS (HCC): Status: ACTIVE | Noted: 2023-03-29

## 2023-07-17 PROBLEM — Z90.79 H/O UNILATERAL ORCHIECTOMY: Status: ACTIVE | Noted: 2023-03-29

## 2023-07-17 PROCEDURE — 99214 OFFICE O/P EST MOD 30 MIN: CPT | Performed by: NURSE PRACTITIONER

## 2023-07-17 NOTE — PROGRESS NOTES
Name: Xiomara Kwon III      : 1995      MRN: [id number]  Encounter Provider: AJ Ceja  Encounter Date: 2023   Encounter department: [address] [place] [address]     1. Abnormal CT of the abdomen  -     Ambulatory Referral to Gastroenterology; Future    2. Esophagitis  -     Ambulatory Referral to Gastroenterology; Future    3. Abdominal cramping  -     Ambulatory Referral to Gastroenterology; Future    4. Pulmonary nodules  -     CT chest wo contrast; Future; Expected date: 2023    5. Mild scoliosis  -     Ambulatory Referral to Pain Management; Future    6. Chronic left-sided low back pain with left-sided sciatica  -     Ambulatory Referral to Pain Management; Future    7. Malignant neoplasm of left testis, unspecified whether descended or undescended [facility])      Reviewed ER visit with patient. Complete repeat CT of chest in first week in beginning of august.   Follow up with GI. If he cannot get in within a few weeks he is to notify me. Follow up with pain management. PT offered too but prefers to see pain management first.  Start omeprazole. Can use bentyl as needed. Please call the office if you are experiencing any worsening of symptoms or no symptom improvement. Subjective        Patient was seen in the emergency room on July 10 for abdominal pain. CT chest abdomen pelvis completed that had multiple findings to be reviewed. Findings below:     Subcentimeter cavitary nodular lesion in the right upper lobe, could be   infectious or inflammatory but metastatic disease cannot excluded given the   clinical history. Recommend short-term follow-up reevaluation. Several small nodules in the right upper lobe are also nonspecific, could be   infectious or inflammatory rather than metastatic. Consider short-term   follow-up.       Mild bilateral perihilar bronchial wall thickening, nonspecific, correlate   clinically for symptoms of bronchitis/bronchiolitis. Persistent byron mesentery to the left of midline, surrounding numerous   persistent minimally prominent mesenteric nodes, may represent mild mesenteric   panniculitis. Thickening of the distal thoracic esophageal wall, cannot exclude mild   esophagitis. Nonspecific decreased opacification of mesenteric veins to the left of   midline compared to the veins to the right of midline, of uncertain etiology,   without abnormal dilatation compared to the prior exam and without increased   mesenteric edema superimposed over the byron mesentery appearance described   above. No portal vein thrombus. No evidence of enteritis or colitis. Findings   may be due to slight technical differences/timing differences compared to the   prior exam. Mesenteric vein thrombus is felt to be less likely. Recommend   attention to these vessels on follow-up imaging. Mild colorectal fecal retention. Has been sick the past 5 days. Stomach felt bloated and has some stomach pain. Had mild constipation for a few days. After he had a BM he still felt bloated and a weird gas pain. He doesn't have symptoms anymore. Yesterday they resolved. He hasn't picked up PPI from pharmacy yet that was prescribed by ER Prilosec 20 mg and Bentyl PRN. He reports no respiratory symptoms at that time of imaging. Review of Systems   Constitutional: Negative for chills and fever. Eyes: Negative for discharge. Respiratory: Negative for shortness of breath. Cardiovascular: Negative for chest pain. Gastrointestinal: Negative for constipation and diarrhea. Genitourinary: Negative for difficulty urinating. Musculoskeletal: Negative for joint swelling. Skin: Negative for rash. Neurological: Negative for headaches. Hematological: Negative for adenopathy. Psychiatric/Behavioral: The patient is not nervous/anxious.         Current Outpatient Medications on File Prior to Visit   Medication Sig   • [DISCONTINUED] acetaminophen (TYLENOL) 325 mg tablet Take 2 tablets (650 mg total) by mouth every 6 (six) hours as needed for mild pain or fever (Patient not taking: Reported on 7/17/2023)   • [DISCONTINUED] metroNIDAZOLE (METROGEL) 1 % gel Apply topically daily (Patient not taking: Reported on 7/17/2023)       Objective     /76 (BP Location: Left arm, Patient Position: Sitting, Cuff Size: Standard)   Pulse 73   Temp 97.9 °F (36.6 °C) (Temporal)   Ht 5' 10.5" (1.791 m)   Wt 93.5 kg (206 lb 3.2 oz)   SpO2 98%   BMI 29.17 kg/m²     Physical Exam  Vitals and nursing note reviewed. Constitutional:       General: He is not in acute distress. Appearance: He is well-developed. He is not diaphoretic. HENT:      Head: Normocephalic and atraumatic. Right Ear: External ear normal.      Left Ear: External ear normal.   Eyes:      General: Lids are normal.         Right eye: No discharge. Left eye: No discharge. Conjunctiva/sclera: Conjunctivae normal.   Cardiovascular:      Rate and Rhythm: Normal rate and regular rhythm. Heart sounds: No murmur heard. Pulmonary:      Effort: Pulmonary effort is normal. No respiratory distress. Breath sounds: Normal breath sounds. No wheezing. Musculoskeletal:         General: No deformity. Cervical back: Neck supple. Skin:     General: Skin is warm and dry. Neurological:      Mental Status: He is alert and oriented to person, place, and time. Psychiatric:         Speech: Speech normal.         Behavior: Behavior normal.         Thought Content:  Thought content normal.         Judgment: Judgment normal.       AJ Vargas

## 2023-07-17 NOTE — PATIENT INSTRUCTIONS
Complete repeat CT of chest in first week in beginning of august.     Follow up with GI. Follow up with pain management. Start omeprazole. Can use bentyl as needed. Please call the office if you are experiencing any worsening of symptoms or no symptom improvement.

## 2023-07-27 NOTE — TELEPHONE ENCOUNTER
Call pt for post hospital appointment/ follow up on recent hospital visit  Amina Soto
I called Amparo Aguirre  as  of now he is dong ok  He follow up with her surgeon  Pt does wish to schedule a follow up on his most recent hospitalization ,but doesn't know when     I will call call pt sometime middle of next week per pt's request 
I called pt he would  like to come in for post hospital visit  Pt is following up with general surgery  Pt just got insurance  Pt will give the office a bernie back once insurance card is available to verify we take it 
This may be completed  Pt will call us once he receives his insurance card 
tonyom requesting call back from pt 
None

## 2023-09-22 ENCOUNTER — CONSULT (OUTPATIENT)
Dept: PAIN MEDICINE | Facility: CLINIC | Age: 28
End: 2023-09-22
Payer: COMMERCIAL

## 2023-09-22 VITALS
SYSTOLIC BLOOD PRESSURE: 114 MMHG | BODY MASS INDEX: 28.84 KG/M2 | WEIGHT: 206 LBS | DIASTOLIC BLOOD PRESSURE: 64 MMHG | HEIGHT: 71 IN

## 2023-09-22 DIAGNOSIS — M47.816 LUMBAR SPONDYLOSIS: ICD-10-CM

## 2023-09-22 DIAGNOSIS — M24.552 HIP FLEXOR TIGHTNESS, LEFT: ICD-10-CM

## 2023-09-22 DIAGNOSIS — M99.03 LUMBAR REGION SOMATIC DYSFUNCTION: ICD-10-CM

## 2023-09-22 DIAGNOSIS — M54.50 CHRONIC LEFT-SIDED LOW BACK PAIN WITHOUT SCIATICA: Primary | ICD-10-CM

## 2023-09-22 DIAGNOSIS — G89.29 CHRONIC LEFT-SIDED LOW BACK PAIN WITHOUT SCIATICA: Primary | ICD-10-CM

## 2023-09-22 PROCEDURE — 99204 OFFICE O/P NEW MOD 45 MIN: CPT | Performed by: ANESTHESIOLOGY

## 2023-09-22 NOTE — PROGRESS NOTES
Assessment  1. Chronic left-sided low back pain without sciatica    2. Lumbar region somatic dysfunction    3. Lumbar spondylosis    4. Hip flexor tightness, left        Plan  Patient presenting with chronic back pain for 8+ years, worsening in the past several months  Pain is consistent with lumbar region somatic dysfunction, hip flexor tightness, lumbar spondylosis accompanied by pain >7/10 on the pain scale with inability to participate in IADLs for >6 weeks. Patient has participated with physical therapy as well as home exercises and stretches in the past. Denies any bowel or bladder incontinence, saddle anesthesia. Reviewed and interpreted relevant imaging studies - specifically CT chest abdomen pelvis and discussed the results and clinical significance with the patient. This showed mild multilevel scoliosis and pseudoarthrosis on the left at L5-S1.      - Patient does have predominantly left-sided lower back pain that does not radiate in a dermatomal fashion. This time we discussed that as he has tried physical therapy and stretches in the past we will refer him to chiropractic therapy for alternative treatment modality    - Discussed trial of over-the-counter Salonpas patches. - Discussed possible medial branch nerve blocks and radiofrequency ablation. Patient would like to trial chiropractic therapy first.  We will follow-up in 2 months for reassessment. - We will obtain baseline lumbar spine x-rays today. Reviewed external notes from the relevant aspects of the patient's medical record, specifically primary care physician notes in regards to current and prior treatments tried (as mentioned in history of present illness). Reviewed pertinent laboratory studies, specifically renal function, hemoglobin A1c, CBC, coagulation studies, prior to recommending medication therapies/interventional treatment options.     [place] Prescription Drug Monitoring Program report was reviewed and was appropriate     My impressions and treatment recommendations were discussed in detail with the patient who verbalized understanding and had no further questions. Discharge instructions were provided. I personally saw and examined the patient and I agree with the above discussed plan of care. Orders Placed This Encounter   Procedures   • X-ray lumbar spine complete 4+ views     Standing Status:   Future     Number of Occurrences:   1     Standing Expiration Date:   9/22/2027     Scheduling Instructions:      Bring along any outside films relating to this procedure. • Ambulatory referral to Chiropractic     Standing Status:   Future     Standing Expiration Date:   9/22/2024     Referral Priority:   Routine     Referral Type:   Chiropractic     Referral Reason:   Specialty Services Required     Requested Specialty:   Chiropractic Medicine     Number of Visits Requested:   1     Expiration Date:   9/22/2024     No orders of the defined types were placed in this encounter. History of Present Illness    Brianna Santizo III is a 32 y.o. male presenting for consultation at [address] [place] [address] [facility] for exam and evaluation of chronic left > right low back pain for 8+ years, worsening over the past several months. Pain started without any precipitating injury or trauma. Over the past month, the intensity of pain has been Moderate to severe. Pain is currently 7/10. Pain does interfere with age appropriate activities of daily living. Pain is nearly constant, with no typical pattern throughout the day. Pain is described as shooting, sharp, throbbing, dull/aching. Patient denies weakness in the lower extremities. Assistance device used: None. Pain is increased with bending, sitting. Pain is decreased with resting. Prior pertinent treatments tried: Heat/ice, physical therapy, home stretches.   Pertinent medications tried/currently taking: Tylenol, ibuprofen, naproxen      I have personally reviewed and/or updated the patient's past medical history, past surgical history, family history, social history, current medications, allergies, and vital signs today. Review of Systems   Constitutional: Negative for chills and fever. HENT: Negative for ear pain and sore throat. Eyes: Negative for pain and visual disturbance. Respiratory: Negative for cough and shortness of breath. Cardiovascular: Negative for chest pain and palpitations. Gastrointestinal: Negative for abdominal pain and vomiting. Genitourinary: Negative for dysuria and hematuria. Musculoskeletal: Positive for back pain, gait problem and myalgias. Negative for arthralgias. Skin: Negative for color change and rash. Neurological: Positive for weakness. Negative for seizures and syncope. All other systems reviewed and are negative. Patient Active Problem List   Diagnosis   • Mass of left testicle   • Acute appendicitis with localized peritonitis, without perforation, abscess, or gangrene   • H/O unilateral orchiectomy   • Seminoma of descended left testis [facility])   • History of testicular cancer   • Malignant neoplasm of left testis ([address])   • Chronic left-sided low back pain with left-sided sciatica   • Mild scoliosis   • Pulmonary nodules   • Abnormal CT of the abdomen       Past Medical History:   Diagnosis Date   • Cancer [facility])        Past Surgical History:   Procedure Laterality Date   • FRACTURE SURGERY      rt elbow   • ORCHIECTOMY  03/2023   • [place] LAPAROSCOPIC APPENDECTOMY N/A 01/19/2021    Procedure: APPENDECTOMY LAPAROSCOPIC;  Surgeon: Lilly Sargent MD;  Location: [address] [place] [address];  Service: General       History reviewed. No pertinent family history. Social History     Occupational History   • Not on file   Tobacco Use   • Smoking status: Every Day     Packs/day: 0.50     Types: Cigarettes   • Smokeless tobacco: Never   Vaping Use   • Vaping Use: Never used   Substance and Sexual Activity   • Alcohol use:  Yes Alcohol/week: 1.0 standard drink of alcohol     Types: 1 Cans of beer per week     Comment: occ   • Drug use: Yes     Types: Marijuana     Comment: occ   • Sexual activity: Yes     Partners: Female       No current outpatient medications on file prior to visit. No current facility-administered medications on file prior to visit. No Known Allergies    Physical Exam    /64   Ht 5' 10.5" (1.791 m)   Wt 93.4 kg (206 lb)   BMI 29.14 kg/m²     Constitutional: normal, well developed, well nourished, alert, in no distress and non-toxic and no overt pain behavior. Eyes: anicteric  HEENT: grossly intact  Neck: supple, symmetric, trachea midline and no masses   Pulmonary:even and unlabored  Cardiovascular:No edema or pitting edema present  Skin:Normal without rashes or lesions and well hydrated  Psychiatric:Mood and affect appropriate  Neurologic: No focal neurologic deficits. Motor function is grossly intact. Musculoskeletal: Full lumbar spine range of motion. Increased tension in the left lumbar paraspinal region. Hip flexor tightness present. Pain with lumbar extension and lateral flexion. Gait is nonantalgic. Imaging  CT chest abdomen pelvis 7/5/2023:  Bones: No suspicious osseous lesions. Mild multilevel scoliosis in the spine. Normal variant pseudoarthrosis at left L5-S1, can be sources of chronic back   pain.

## 2023-11-09 ENCOUNTER — HOSPITAL ENCOUNTER (EMERGENCY)
Facility: HOSPITAL | Age: 28
Discharge: HOME | End: 2023-11-09
Attending: EMERGENCY MEDICINE
Payer: COMMERCIAL

## 2023-11-09 ENCOUNTER — APPOINTMENT (EMERGENCY)
Dept: RADIOLOGY | Facility: HOSPITAL | Age: 28
End: 2023-11-09
Payer: COMMERCIAL

## 2023-11-09 VITALS
OXYGEN SATURATION: 98 % | HEART RATE: 104 BPM | SYSTOLIC BLOOD PRESSURE: 131 MMHG | DIASTOLIC BLOOD PRESSURE: 88 MMHG | TEMPERATURE: 98.2 F | RESPIRATION RATE: 18 BRPM

## 2023-11-09 DIAGNOSIS — M79.641 RIGHT HAND PAIN: Primary | ICD-10-CM

## 2023-11-09 PROCEDURE — 99284 EMERGENCY DEPT VISIT MOD MDM: CPT | Performed by: EMERGENCY MEDICINE

## 2023-11-09 PROCEDURE — 96372 THER/PROPH/DIAG INJ SC/IM: CPT

## 2023-11-09 PROCEDURE — 73130 X-RAY EXAM OF HAND: CPT

## 2023-11-09 PROCEDURE — 73110 X-RAY EXAM OF WRIST: CPT

## 2023-11-09 PROCEDURE — 99284 EMERGENCY DEPT VISIT MOD MDM: CPT

## 2023-11-09 RX ORDER — KETOROLAC TROMETHAMINE 30 MG/ML
15 INJECTION, SOLUTION INTRAMUSCULAR; INTRAVENOUS ONCE
Status: COMPLETED | OUTPATIENT
Start: 2023-11-09 | End: 2023-11-09

## 2023-11-09 RX ADMIN — KETOROLAC TROMETHAMINE 15 MG: 30 INJECTION, SOLUTION INTRAMUSCULAR; INTRAVENOUS at 15:36

## 2023-11-09 NOTE — ED ATTENDING ATTESTATION
11/9/2023  I, Maud Councilman, MD, saw and evaluated the patient. I have discussed the patient with the resident/non-physician practitioner and agree with the resident's/non-physician practitioner's findings, Plan of Care, and MDM as documented in the resident's/non-physician practitioner's note, except where noted. All available labs and Radiology studies were reviewed. I was present for key portions of any procedure(s) performed by the resident/non-physician practitioner and I was immediately available to provide assistance. At this point I agree with the current assessment done in the Emergency Department. I have conducted an independent evaluation of this patient a history and physical is as follows:   The patient presents for evaluation of hand injury the patient states he punched a wall with his right hand he struck the thumb and second digit on the wall he is right-hand dominant  Patient has had a prior boxer's fracture of the fifth metacarpal on the right hand  On exam he has pain and tenderness over the metacarpal phalangeal joint of the thumb mild tenderness at the IP joint of the second digit no deformity no rotational deformity neurovascular status intact seems to be some laxity of the ulnar collateral ligament of the thumb  No snuffbox tenderness    Impression: Hand injury ulnar collateral ligament injury    Will x-ray to rule out fracture    She will be placed in a thumb spica and referred to hand surgery  ED Course         Critical Care Time  Procedures

## 2023-11-09 NOTE — Clinical Note
Renato Kaur was seen and treated in our emergency department on 11/9/2023. Diagnosis:     Harjeet  . He may return on this date: If you have any questions or concerns, please don't hesitate to call.       Aaron Mclain MD    ______________________________           _______________          _______________  Hospital Representative                              Date                                Time

## 2023-11-09 NOTE — ED PROVIDER NOTES
History  Chief Complaint   Patient presents with    Hand Injury     Pt punched drywall with stud behind it, right 1st, 2nd, and 3rd digits are painful and swollen     31 yo M complains of R hand pain after he punched a wall yesterday. He states he was angry and punched the wall with the medical side of her R hand. He has been experiencing increasing pain and swelling in his first 2 digits. He is able to move the digits. No numbness, feels tingling sensation. None       Past Medical History:   Diagnosis Date    Cancer [facility])        Past Surgical History:   Procedure Laterality Date    FRACTURE SURGERY      rt elbow    ORCHIECTOMY  03/2023    [place] LAPAROSCOPIC APPENDECTOMY N/A 01/19/2021    Procedure: APPENDECTOMY LAPAROSCOPIC;  Surgeon: Margi Greer MD;  Location: [address] [place] [address];  Service: General       No family history on file. I have reviewed and agree with the history as documented. E-Cigarette/Vaping    E-Cigarette Use Never User      E-Cigarette/Vaping Substances    Nicotine Yes     THC No     CBD No     Flavoring No     Other No     Unknown No      Social History     Tobacco Use    Smoking status: Every Day     Packs/day: 0.50     Types: Cigarettes    Smokeless tobacco: Never   Vaping Use    Vaping Use: Never used   Substance Use Topics    Alcohol use: Yes     Alcohol/week: 1.0 standard drink of alcohol     Types: 1 Cans of beer per week     Comment: occ    Drug use: Yes     Types: Marijuana     Comment: occ        Review of Systems   Musculoskeletal:  Positive for arthralgias. All other systems reviewed and are negative.       Physical Exam  ED Triage Vitals [11/09/23 1347]   Temperature Pulse Respirations Blood Pressure SpO2   98.2 °F (36.8 °C) 104 18 131/88 98 %      Temp Source Heart Rate Source Patient Position - Orthostatic VS BP Location FiO2 (%)   Temporal Monitor Sitting Left arm --      Pain Score       8             Orthostatic Vital Signs  Vitals:    11/09/23 1347   BP: 131/88   Pulse: 104   Patient Position - Orthostatic VS: Sitting         Physical Exam  Gen: NAD, AA&Ox3  HEENT: PERRL, EOMI  Neck: supple  CV: RRR  Lungs: CTA B/L  Abdomen: soft, NT/ND, no rebound  Ext: no swelling or deformity  Neuro: 5/5 strength all extremities, sensation grossly intact  Skin: no rash   R hand: swelling in his 1st and second digit. TTP, full ROM      ED Medications  Medications   ketorolac (TORADOL) injection 15 mg (15 mg Intramuscular Given 11/9/23 1536)       Diagnostic Studies  Results Reviewed       None                   XR hand 3+ views RIGHT    (Results Pending)   XR wrist 3+ views RIGHT    (Results Pending)         Procedures  Procedures      ED Course                                       Medical Decision Making  No obvious deformity, likely soft tissue injury. Will order XR to assess for fx and Toradol for pain     No obvious fx, will send pt home in thumb spica. Strict return precautions given, care plan discussed. Pt will follow-up with hand surgery and PCP. All questions answered, pt stable for discharge. Amount and/or Complexity of Data Reviewed  Radiology: ordered and independent interpretation performed. Details: No obvious fx    Risk  Prescription drug management. Disposition  Final diagnoses:   Right hand pain     Time reflects when diagnosis was documented in both MDM as applicable and the Disposition within this note       Time User Action Codes Description Comment    11/9/2023  4:51 PM Matteojosr Scott Add [I29.568] Right hand pain           ED Disposition       ED Disposition   Discharge    Condition   Stable    Date/Time   Thu Nov 9, 2023  4:51 PM    [address] [facility] discharge to home/self care.                    Follow-up Information       Follow up With Specialties Details Why [address] [address] [facility] [address] [place], [facility] miguel Evans Nurse Practitioner Schedule an appointment as soon as possible for a visit   [address] [address] [place] [address] DrNav[address]et [address]  [address]  [phone] Honey Morris MD Orthopedic Surgery, Hand Surgery Schedule an appointment as soon as possible for a visit in 1 week  [address] [place] [address] [address]               There are no discharge medications for this patient. No discharge procedures on file. PDMP Review         Value Time User    PDMP Reviewed  Yes 9/22/2023  9:08 AM Will Marlyn Washington MD             ED Provider  Attending physically available and evaluated Bella Arriaga III. I managed the patient along with the ED Attending.     Electronically Signed by           Sarah Junior MD  11/09/23 3007

## 2023-11-09 NOTE — DISCHARGE INSTRUCTIONS
Please schedule appointment with a hand surgeon for follow-up in 1 week. Patient schedule follow-up appointment with your PCP as well    Please wear the brace until seen by Ortho    You can take Tylenol and/or Motrin for pain as needed, follow the instructions on the bottle patient    Return to the emergency department if you experience increased swelling, pain that does not go away with medications, or any other concerning symptoms.

## 2024-10-08 ENCOUNTER — OFFICE VISIT (OUTPATIENT)
Dept: FAMILY MEDICINE CLINIC | Facility: CLINIC | Age: 29
End: 2024-10-08
Payer: COMMERCIAL

## 2024-10-08 ENCOUNTER — TELEPHONE (OUTPATIENT)
Age: 29
End: 2024-10-08

## 2024-10-08 VITALS
BODY MASS INDEX: 30.52 KG/M2 | OXYGEN SATURATION: 98 % | HEIGHT: 71 IN | HEART RATE: 98 BPM | WEIGHT: 218 LBS | SYSTOLIC BLOOD PRESSURE: 118 MMHG | DIASTOLIC BLOOD PRESSURE: 78 MMHG | TEMPERATURE: 97.9 F

## 2024-10-08 DIAGNOSIS — C62.12 SEMINOMA OF DESCENDED LEFT TESTIS (HCC): Primary | ICD-10-CM

## 2024-10-08 DIAGNOSIS — R91.8 PULMONARY NODULES: ICD-10-CM

## 2024-10-08 DIAGNOSIS — F33.0 MILD EPISODE OF RECURRENT MAJOR DEPRESSIVE DISORDER (HCC): ICD-10-CM

## 2024-10-08 DIAGNOSIS — R10.84 GENERALIZED ABDOMINAL PAIN: ICD-10-CM

## 2024-10-08 PROCEDURE — 99214 OFFICE O/P EST MOD 30 MIN: CPT | Performed by: FAMILY MEDICINE

## 2024-10-08 RX ORDER — ESCITALOPRAM OXALATE 5 MG/1
5 TABLET ORAL DAILY
Qty: 30 TABLET | Refills: 0 | Status: SHIPPED | OUTPATIENT
Start: 2024-10-08

## 2024-10-08 NOTE — ASSESSMENT & PLAN NOTE
He does have multiple pulmonary nodules on CAT scan, did not get the repeat CT completed.  I will reorder this for him  Orders:    CT chest wo contrast; Future

## 2024-10-08 NOTE — TELEPHONE ENCOUNTER
Contacted the patient regarding a routine referral to verify service needs and inform pt of the current waitlist. Message left for patient to call back for assistance with being placed on the proper wait list.

## 2024-10-11 NOTE — TELEPHONE ENCOUNTER
Contacted patient in regards to Routine Referral in attempts to verify patient's needs of services and add patient to proper wait list. LVM for patient to return call in regards to referral.     2nd attempt.

## 2024-10-16 NOTE — TELEPHONE ENCOUNTER
Contacted patient in regards to Routine Referral in attempts to verify patient's needs of services and add patient to proper wait list. LVM for patient to contact intake dept  in regards to referral.     3rd attempt, referral closed.

## (undated) DEVICE — SURGICAL CLIPPER BLADE GENERAL USE

## (undated) DEVICE — CHLORAPREP HI-LITE 26ML ORANGE

## (undated) DEVICE — THE ECHELON FLEX POWERED PLUS ARTICULATING ENDOSCOPIC LINEAR CUTTERS ARE STERILE, SINGLE PATIENT USE INSTRUMENTS THAT SIMULTANEOUSLYCUT AND STAPLE TISSUE. THERE ARE SIX STAGGERED ROWS OF STAPLES, THREE ON EITHER SIDE OF THE CUT LINE. THE ECHELON FLEX 45 POWERED PLUSINSTRUMENTS HAVE A STAPLE LINE THAT IS APPROXIMATELY 45 MM LONG AND A CUT LINE THAT IS APPROXIMATELY 42 MM LONG. THE SHAFT CAN ROTATE FREELYIN BOTH DIRECTIONS AND AN ARTICULATION MECHANISM ENABLES THE DISTAL PORTION OF THE SHAFT TO PIVOT TO FACILITATE LATERAL ACCESS TO THE OPERATIVESITE.THE INSTRUMENTS ARE PACKAGED WITH A PRIMARY LITHIUM BATTERY PACK THAT MUST BE INSTALLED PRIOR TO USE. THERE ARE SPECIFIC REQUIREMENTS FORDISPOSING OF THE BATTERY PACK. REFER TO THE BATTERY PACK DISPOSAL SECTION.THE INSTRUMENTS ARE PACKAGED WITHOUT A RELOAD AND MUST BE LOADED PRIOR TO USE. A STAPLE RETAINING CAP ON THE RELOAD PROTECTS THE STAPLE LEGPOINTS DURING SHIPPING AND TRANSPORTATION. THE INSTRUMENTS’ LOCK-OUT FEATURE IS DESIGNED TO PREVENT A USED OR IMPROPERLY INSTALLED RELOADFROM BEING REFIRED OR AN INSTRUMENT FROM BEING FIRED WITHOUT A RELOAD.: Brand: ECHELON FLEX

## (undated) DEVICE — ENDOPOUCH RETRIEVER SPECIMEN RETRIEVAL BAGS: Brand: ENDOPOUCH RETRIEVER

## (undated) DEVICE — SUT VICRYL 0 UR-6 27 IN J603H

## (undated) DEVICE — STERILE POLYISOPRENE POWDER-FREE SURGICAL GLOVES: Brand: PROTEXIS

## (undated) DEVICE — STERILE POLYISOPRENE POWDER-FREE SURGICAL GLOVES WITH EMOLLIENT COATING: Brand: PROTEXIS

## (undated) DEVICE — TROCAR: Brand: KII FIOS FIRST ENTRY

## (undated) DEVICE — CRADLE EXTREMITY UNIVERSAL CONTOURED

## (undated) DEVICE — ENDOPATH PNEUMONEEDLE INSUFFLATION NEEDLES WITH LUER LOCK CONNECTORS 120MM: Brand: ENDOPATH

## (undated) DEVICE — HARMONIC 1100 SHEARS, 36CM SHAFT LENGTH: Brand: HARMONIC

## (undated) DEVICE — TROCAR: Brand: KII® SLEEVE

## (undated) DEVICE — INTENDED FOR TISSUE SEPARATION, AND OTHER PROCEDURES THAT REQUIRE A SHARP SURGICAL BLADE TO PUNCTURE OR CUT.: Brand: BARD-PARKER SAFETY BLADES SIZE 11, STERILE

## (undated) DEVICE — THE ECHELON, ECHELON ENDOPATH™ AND ECHELON FLEX™ FAMILIES OF ENDOSCOPIC LINEAR CUTTERS AND RELOADS ARE STERILE, SINGLE PATIENT USE INSTRUMENTS THAT SIMULTANEOUSLY CUT AND STAPLE TISSUE. THERE ARE SIX STAGGERED ROWS OF STAPLES, THREE ON EITHER SIDE OF THE CUT LINE. THE 45 MM INSTRUMENTS HAVE A STAPLE LINE THATIS APPROXIMATELY 45 MM LONG AND A CUT LINE THAT IS APPROXIMATELY 42 MM LONG. THE SHAFT CAN ROTATE FREELY IN BOTH DIRECTIONS AND AN ARTICULATION MECHANISM ON ARTICULATING INSTRUMENTS ENABLES BENDING THE DISTAL PORTIONOF THE SHAFT TO FACILITATE LATERAL ACCESS OF THE OPERATIVE SITE.THE INSTRUMENTS ARE SHIPPED WITHOUT A RELOAD AND MUST BE LOADED PRIOR TO USE. A STAPLE RETAINING CAP ON THE RELOAD PROTECTS THE STAPLE LEG POINTS DURING SHIPPING AND TRANSPORTATION. THE INSTRUMENTS’ LOCK-OUT FEATURE IS DESIGNED TO PREVENT A USED RELOAD FROM BEING REFIRED.: Brand: ECHELON ENDOPATH

## (undated) DEVICE — ADHESIVE SKIN HIGH VISCOSITY EXOFIN 1ML

## (undated) DEVICE — SUT MONOCRYL 4-0 PS-2 27 IN Y426H

## (undated) DEVICE — HYGIENE-FILTER  FOR SINGLE USE

## (undated) DEVICE — ALLENTOWN LAP CHOLE APP PACK: Brand: CARDINAL HEALTH

## (undated) DEVICE — IRRIG ENDO FLO TUBING

## (undated) DEVICE — STANDARD SURGICAL GOWN, L: Brand: CONVERTORS

## (undated) DEVICE — TUBING SET W. FILTER, GAS FLOW 30 L/MIN: Brand: N.A.

## (undated) DEVICE — LIGAMAX 5 MM ENDOSCOPIC MULTIPLE CLIP APPLIER: Brand: LIGAMAX

## (undated) DEVICE — SYRINGE 30ML LL

## (undated) DEVICE — SCD SEQUENTIAL COMPRESSION COMFORT SLEEVE MEDIUM KNEE LENGTH: Brand: KENDALL SCD

## (undated) DEVICE — SINGLE PORT MANIFOLD: Brand: NEPTUNE 2

## (undated) DEVICE — NEEDLE BLUNT 18 G X 1 1/2IN

## (undated) DEVICE — Device: Brand: OMNICLOSE TROCAR SITE CLOSURE DEVICE